# Patient Record
Sex: FEMALE | Race: WHITE | NOT HISPANIC OR LATINO | Employment: OTHER | URBAN - METROPOLITAN AREA
[De-identification: names, ages, dates, MRNs, and addresses within clinical notes are randomized per-mention and may not be internally consistent; named-entity substitution may affect disease eponyms.]

---

## 2017-12-26 ENCOUNTER — TELEPHONE (OUTPATIENT)
Dept: URGENT CARE | Facility: CLINIC | Age: 52
End: 2017-12-26

## 2017-12-26 NOTE — TELEPHONE ENCOUNTER
Flu results entered in ERROR- patient positive for Flu A, negative for Flu B. Unable to change results in computer.

## 2019-05-03 ENCOUNTER — APPOINTMENT (OUTPATIENT)
Dept: LAB | Facility: HOSPITAL | Age: 54
End: 2019-05-03

## 2019-05-03 ENCOUNTER — OFFICE VISIT (OUTPATIENT)
Dept: FAMILY MEDICINE CLINIC | Facility: CLINIC | Age: 54
End: 2019-05-03

## 2019-05-03 VITALS
OXYGEN SATURATION: 97 % | SYSTOLIC BLOOD PRESSURE: 118 MMHG | HEART RATE: 71 BPM | WEIGHT: 217 LBS | DIASTOLIC BLOOD PRESSURE: 80 MMHG | HEIGHT: 65 IN | BODY MASS INDEX: 36.15 KG/M2

## 2019-05-03 DIAGNOSIS — L82.1 SEBORRHEIC KERATOSES: ICD-10-CM

## 2019-05-03 DIAGNOSIS — M25.511 CHRONIC PAIN OF BOTH SHOULDERS: ICD-10-CM

## 2019-05-03 DIAGNOSIS — G89.29 CHRONIC PAIN OF BOTH SHOULDERS: ICD-10-CM

## 2019-05-03 DIAGNOSIS — M25.512 CHRONIC PAIN OF BOTH SHOULDERS: ICD-10-CM

## 2019-05-03 DIAGNOSIS — B18.2 CHRONIC HEPATITIS C WITHOUT HEPATIC COMA (HCC): ICD-10-CM

## 2019-05-03 DIAGNOSIS — R53.82 CHRONIC FATIGUE: ICD-10-CM

## 2019-05-03 DIAGNOSIS — R11.0 CHRONIC NAUSEA: ICD-10-CM

## 2019-05-03 DIAGNOSIS — M25.50 ARTHRALGIA, UNSPECIFIED JOINT: ICD-10-CM

## 2019-05-03 DIAGNOSIS — R05.3 CHRONIC COUGH: Primary | ICD-10-CM

## 2019-05-03 LAB
ALBUMIN SERPL-MCNC: 4.3 G/DL (ref 3.5–5.2)
ALBUMIN/GLOB SERPL: 1.2 G/DL
ALP SERPL-CCNC: 120 U/L (ref 39–117)
ALT SERPL W P-5'-P-CCNC: 25 U/L (ref 1–33)
ANION GAP SERPL CALCULATED.3IONS-SCNC: 12.1 MMOL/L
AST SERPL-CCNC: 27 U/L (ref 1–32)
BASOPHILS # BLD AUTO: 0.05 10*3/MM3 (ref 0–0.2)
BASOPHILS NFR BLD AUTO: 0.5 % (ref 0–1.5)
BILIRUB SERPL-MCNC: 0.3 MG/DL (ref 0.2–1.2)
BUN BLD-MCNC: 16 MG/DL (ref 6–20)
BUN/CREAT SERPL: 18.8 (ref 7–25)
CALCIUM SPEC-SCNC: 9.6 MG/DL (ref 8.6–10.5)
CHLORIDE SERPL-SCNC: 101 MMOL/L (ref 98–107)
CHROMATIN AB SERPL-ACNC: <10 IU/ML (ref 0–14)
CO2 SERPL-SCNC: 22.9 MMOL/L (ref 22–29)
CREAT BLD-MCNC: 0.85 MG/DL (ref 0.57–1)
CRP SERPL-MCNC: 0.75 MG/DL (ref 0–0.5)
DEPRECATED RDW RBC AUTO: 42.4 FL (ref 37–54)
EOSINOPHIL # BLD AUTO: 0.17 10*3/MM3 (ref 0–0.4)
EOSINOPHIL NFR BLD AUTO: 1.6 % (ref 0.3–6.2)
ERYTHROCYTE [DISTWIDTH] IN BLOOD BY AUTOMATED COUNT: 11.7 % (ref 12.3–15.4)
GFR SERPL CREATININE-BSD FRML MDRD: 69 ML/MIN/1.73
GLOBULIN UR ELPH-MCNC: 3.6 GM/DL
GLUCOSE BLD-MCNC: 79 MG/DL (ref 65–99)
HCT VFR BLD AUTO: 42.6 % (ref 34–46.6)
HGB BLD-MCNC: 13.9 G/DL (ref 12–15.9)
IMM GRANULOCYTES # BLD AUTO: 0.04 10*3/MM3 (ref 0–0.05)
IMM GRANULOCYTES NFR BLD AUTO: 0.4 % (ref 0–0.5)
LYMPHOCYTES # BLD AUTO: 3.02 10*3/MM3 (ref 0.7–3.1)
LYMPHOCYTES NFR BLD AUTO: 28 % (ref 19.6–45.3)
MCH RBC QN AUTO: 32.2 PG (ref 26.6–33)
MCHC RBC AUTO-ENTMCNC: 32.6 G/DL (ref 31.5–35.7)
MCV RBC AUTO: 98.6 FL (ref 79–97)
MONOCYTES # BLD AUTO: 0.7 10*3/MM3 (ref 0.1–0.9)
MONOCYTES NFR BLD AUTO: 6.5 % (ref 5–12)
NEUTROPHILS # BLD AUTO: 6.8 10*3/MM3 (ref 1.7–7)
NEUTROPHILS NFR BLD AUTO: 63 % (ref 42.7–76)
NRBC BLD AUTO-RTO: 0 /100 WBC (ref 0–0.2)
PLATELET # BLD AUTO: 256 10*3/MM3 (ref 140–450)
PMV BLD AUTO: 10.4 FL (ref 6–12)
POTASSIUM BLD-SCNC: 4.2 MMOL/L (ref 3.5–5.2)
PROT SERPL-MCNC: 7.9 G/DL (ref 6–8.5)
RBC # BLD AUTO: 4.32 10*6/MM3 (ref 3.77–5.28)
SODIUM BLD-SCNC: 136 MMOL/L (ref 136–145)
TSH SERPL DL<=0.05 MIU/L-ACNC: 2.59 MIU/ML (ref 0.27–4.2)
URATE SERPL-MCNC: 4.5 MG/DL (ref 2.4–5.7)
VIT B12 BLD-MCNC: 484 PG/ML (ref 211–946)
WBC NRBC COR # BLD: 10.78 10*3/MM3 (ref 3.4–10.8)

## 2019-05-03 PROCEDURE — 36415 COLL VENOUS BLD VENIPUNCTURE: CPT | Performed by: FAMILY MEDICINE

## 2019-05-03 PROCEDURE — 84443 ASSAY THYROID STIM HORMONE: CPT | Performed by: FAMILY MEDICINE

## 2019-05-03 PROCEDURE — 84550 ASSAY OF BLOOD/URIC ACID: CPT | Performed by: FAMILY MEDICINE

## 2019-05-03 PROCEDURE — 80053 COMPREHEN METABOLIC PANEL: CPT | Performed by: FAMILY MEDICINE

## 2019-05-03 PROCEDURE — 86038 ANTINUCLEAR ANTIBODIES: CPT | Performed by: FAMILY MEDICINE

## 2019-05-03 PROCEDURE — 82607 VITAMIN B-12: CPT | Performed by: FAMILY MEDICINE

## 2019-05-03 PROCEDURE — 86140 C-REACTIVE PROTEIN: CPT | Performed by: FAMILY MEDICINE

## 2019-05-03 PROCEDURE — 87522 HEPATITIS C REVRS TRNSCRPJ: CPT | Performed by: FAMILY MEDICINE

## 2019-05-03 PROCEDURE — 86431 RHEUMATOID FACTOR QUANT: CPT | Performed by: FAMILY MEDICINE

## 2019-05-03 PROCEDURE — 99214 OFFICE O/P EST MOD 30 MIN: CPT | Performed by: FAMILY MEDICINE

## 2019-05-03 PROCEDURE — 85025 COMPLETE CBC W/AUTO DIFF WBC: CPT | Performed by: FAMILY MEDICINE

## 2019-05-03 RX ORDER — BUPROPION HYDROCHLORIDE 300 MG/1
300 TABLET ORAL DAILY
COMMUNITY

## 2019-05-03 RX ORDER — PANTOPRAZOLE SODIUM 20 MG/1
20 TABLET, DELAYED RELEASE ORAL DAILY
Qty: 30 TABLET | Refills: 2 | Status: SHIPPED | OUTPATIENT
Start: 2019-05-03 | End: 2019-05-08 | Stop reason: CLARIF

## 2019-05-03 NOTE — PROGRESS NOTES
Nathalia Rao presents today to establish care.    Chief Complaint   Patient presents with   • Establish Care     needs new PCP, rashes that come and go, moles to be looked at, back pain, arthritis,    • Cough     chronic   • Shoulder Pain     hurts when wakes up in the morning   • Fatigue   • Nausea        Cough   This is a new problem. The current episode started more than 1 year ago. The problem has been waxing and waning. The cough is non-productive. Associated symptoms include ear pain, myalgias, a rash, shortness of breath and wheezing. Risk factors for lung disease include smoking/tobacco exposure. She has tried nothing for the symptoms. Her past medical history is significant for bronchitis.   Fatigue   This is a new problem. The current episode started more than 1 year ago. The problem has been gradually worsening. Associated symptoms include abdominal pain ( upper), arthralgias, a change in bowel habit, coughing, fatigue, myalgias, nausea, numbness, a rash and weakness.   Nausea   This is a new problem. The current episode started more than 1 year ago. Associated symptoms include abdominal pain ( upper), arthralgias, a change in bowel habit, coughing, fatigue, myalgias, nausea, numbness, a rash and weakness. Treatments tried: dramamine.   Arm Pain    The incident occurred more than 1 week ago (several years). The pain is present in the left shoulder and right shoulder. The quality of the pain is described as aching. The pain has been intermittent since the incident. Associated symptoms include numbness. Nothing aggravates the symptoms. She has tried NSAIDs and acetaminophen for the symptoms. The treatment provided no relief.          Quit vaping 5 months ago and smoked cigarettes off and on. Used to smoke a pack every 3 months.     Taking meds for PTSD and depression has helped but fatigue feels different. Body aches like the flu. Dizziness while walking, but not vertigo.     Nausea first thing waking up.  When she went through menopause had morning sickness. Tends to get constipation, drinks more water and foods high fiber.     Whole body hurts. Inflammation in hands. Feet swell.         PHQ-2/PHQ-9 Depression Screening 5/3/2019   Little interest or pleasure in doing things 0   Feeling down, depressed, or hopeless 1   Total Score 1       Review of Systems   Constitutional: Positive for fatigue.   HENT: Positive for ear pain and trouble swallowing.    Eyes: Positive for pain and itching.   Respiratory: Positive for cough, shortness of breath and wheezing.    Cardiovascular: Positive for leg swelling.   Gastrointestinal: Positive for abdominal pain ( upper), change in bowel habit, constipation and nausea.   Endocrine: Positive for heat intolerance and polydipsia.   Genitourinary: Positive for frequency.   Musculoskeletal: Positive for arthralgias, gait problem and myalgias.   Skin: Positive for color change and rash.   Neurological: Positive for dizziness, weakness and numbness.   Hematological: Bruises/bleeds easily.   Psychiatric/Behavioral: Positive for depressed mood. The patient is nervous/anxious.         Past Medical History:   Diagnosis Date   • Anxiety    • Depression    • Hepatitis C 2001    peg interferon, ribaviron   • Hypertension    • Injury of back    • PTSD (post-traumatic stress disorder)    • Shingles         Past Surgical History:   Procedure Laterality Date   • BACK SURGERY     • CHOLECYSTECTOMY  2001   • COLONOSCOPY  2013    patient states twisted colon   • SPINAL FUSION  2006   • TOTAL ABDOMINAL HYSTERECTOMY WITH SALPINGO OOPHORECTOMY  11/2014    ovarian cysts        Family History   Problem Relation Age of Onset   • Uterine cancer Mother    • Hypertension Mother    • Hyperlipidemia Mother    • Stomach cancer Father    • Colon cancer Father    • Inflammatory bowel disease Father    • Diabetes Maternal Grandmother    • Hypertension Maternal Grandmother    • Hyperlipidemia Maternal Grandmother    •  "Stroke Maternal Grandmother    • Heart attack Maternal Grandfather    • Hypertension Maternal Grandfather         Social History     Socioeconomic History   • Marital status: Single     Spouse name: Not on file   • Number of children: Not on file   • Years of education: Not on file   • Highest education level: Not on file   Tobacco Use   • Smoking status: Former Smoker     Last attempt to quit: 2018     Years since quittin.3   • Smokeless tobacco: Never Used   Substance and Sexual Activity   • Alcohol use: No   • Drug use: Yes     Types: Opium, Other, Oxycodone     Comment: opiotes        Current Outpatient Medications on File Prior to Visit   Medication Sig Dispense Refill   • buPROPion XL (WELLBUTRIN XL) 300 MG 24 hr tablet Take 300 mg by mouth Daily.     • PARoxetine HCl (PAXIL PO) Take 60 mg by mouth Daily.       No current facility-administered medications on file prior to visit.        No Known Allergies     Visit Vitals  /80 (BP Location: Left arm, Patient Position: Sitting, Cuff Size: Adult)   Pulse 71   Ht 165.1 cm (65\")   Wt 98.4 kg (217 lb)   SpO2 97%   BMI 36.11 kg/m²        Physical Exam   Constitutional: She is oriented to person, place, and time. No distress. She is obese.  HENT:   Right Ear: Tympanic membrane and ear canal normal.   Left Ear: Tympanic membrane and ear canal normal.   Nose: Nose normal.   Mouth/Throat: Oropharynx is clear and moist.   Eyes: Conjunctivae are normal. Pupils are equal, round, and reactive to light.   Neck: Neck supple. No thyromegaly present.   Cardiovascular: Normal rate and regular rhythm.   No murmur heard.  Pulses:       Posterior tibial pulses are 2+ on the right side, and 2+ on the left side.   Pulmonary/Chest: Effort normal and breath sounds normal.   Abdominal: Soft. There is no hepatosplenomegaly. There is no tenderness.   Musculoskeletal: She exhibits no edema.   Lymphadenopathy:     She has no cervical adenopathy.   Neurological: She is alert and " oriented to person, place, and time.   Skin: Skin is warm and dry. No rash noted.   Scars bilateral wrists consistent with cutting  SKs left upper back and left thigh   Psychiatric: She has a normal mood and affect.   Vitals reviewed.            Nathalia was seen today for establish care, cough, shoulder pain, fatigue and nausea.    Diagnoses and all orders for this visit:    Chronic cough  -     Full Pulmonary Function Test With Bronchodilator; Future  Evaluate for COPD.  Patient states she mostly had vaping and was very vague regarding her total cigarette use with 1 pack every 3 months.  Seborrheic keratoses  Discussed with patient no signs concerning for skin cancer but likely age-related changes.  Chronic fatigue  -     CBC & Differential; Future  -     Comprehensive Metabolic Panel; Future  -     TSH Rfx On Abnormal To Free T4; Future  -     Vitamin B12; Future  -     CBC & Differential  -     Comprehensive Metabolic Panel  -     TSH Rfx On Abnormal To Free T4  -     Vitamin B12  -     CBC Auto Differential  Check labs today.  Chronic hepatitis C without hepatic coma (CMS/HCC)  -     Comprehensive Metabolic Panel; Future  -     Hepatitis C RNA, Quantitative, PCR (graph); Future  -     Comprehensive Metabolic Panel  -     Hepatitis C RNA, Quantitative, PCR (graph)  Patient reports she previously received treatment 2001.  She is requesting repeat labs today.  Chronic pain of both shoulders  Further evaluation with labs considering ongoing joint pain in multiple locations.  Chronic nausea  -     pantoprazole (PROTONIX) 20 MG EC tablet; Take 1 tablet by mouth Daily.  Start PPI.  Arthralgia, unspecified joint  -     Rheumatoid factor; Future  -     C-reactive protein; Future  -     JOÃO; Future  -     Uric acid; Future  -     Rheumatoid factor  -     C-reactive protein  -     JOÃO  -     Uric acid  Further evaluation with labs considering ongoing joint pain in multiple locations.      Return in about 4 weeks (around  5/31/2019) for Follow-up.

## 2019-05-06 LAB — ANA SER QL: NEGATIVE

## 2019-05-07 LAB
HCV RNA SERPL NAA+PROBE-ACNC: NORMAL IU/ML
TEST INFORMATION: NORMAL

## 2019-05-08 ENCOUNTER — TELEPHONE (OUTPATIENT)
Dept: FAMILY MEDICINE CLINIC | Facility: CLINIC | Age: 54
End: 2019-05-08

## 2019-05-08 NOTE — TELEPHONE ENCOUNTER
Patient's PA for her pantoprazole was denied.  She has to have tried 2 of the following: Nexium, Prevacid, Omeprazole, or Zegrid.

## 2019-05-09 ENCOUNTER — OFFICE VISIT (OUTPATIENT)
Dept: PULMONOLOGY | Facility: CLINIC | Age: 54
End: 2019-05-09

## 2019-05-09 DIAGNOSIS — R05.3 CHRONIC COUGH: ICD-10-CM

## 2019-05-09 PROCEDURE — 94375 RESPIRATORY FLOW VOLUME LOOP: CPT | Performed by: INTERNAL MEDICINE

## 2019-05-09 PROCEDURE — 94726 PLETHYSMOGRAPHY LUNG VOLUMES: CPT | Performed by: INTERNAL MEDICINE

## 2019-05-09 PROCEDURE — 94729 DIFFUSING CAPACITY: CPT | Performed by: INTERNAL MEDICINE

## 2019-05-10 ENCOUNTER — TELEPHONE (OUTPATIENT)
Dept: FAMILY MEDICINE CLINIC | Facility: CLINIC | Age: 54
End: 2019-05-10

## 2019-05-10 RX ORDER — LANSOPRAZOLE 15 MG/1
15 CAPSULE, DELAYED RELEASE ORAL DAILY
Qty: 30 CAPSULE | Refills: 5 | Status: SHIPPED | OUTPATIENT
Start: 2019-05-10 | End: 2020-04-10

## 2019-05-10 NOTE — TELEPHONE ENCOUNTER
Pt called back, informed her of results. She verbalized understanding and had no further questions.

## 2019-05-10 NOTE — TELEPHONE ENCOUNTER
CELINA FROM [facility] CALLED. PT HAS A PRESCRIPTION FOR GENERIC NEXUM BUT [employer] INSURANCE WILL NOT COVER IT. NEEDS A PRESCIPTION FOR PREVACID OVER THE COUNTER.

## 2019-06-14 ENCOUNTER — OFFICE VISIT (OUTPATIENT)
Dept: FAMILY MEDICINE CLINIC | Facility: CLINIC | Age: 54
End: 2019-06-14

## 2019-06-14 VITALS
HEIGHT: 65 IN | WEIGHT: 214 LBS | DIASTOLIC BLOOD PRESSURE: 70 MMHG | BODY MASS INDEX: 35.65 KG/M2 | HEART RATE: 88 BPM | OXYGEN SATURATION: 97 % | SYSTOLIC BLOOD PRESSURE: 128 MMHG

## 2019-06-14 DIAGNOSIS — R53.82 CHRONIC FATIGUE: ICD-10-CM

## 2019-06-14 DIAGNOSIS — M41.9 SCOLIOSIS OF THORACIC SPINE, UNSPECIFIED SCOLIOSIS TYPE: Primary | ICD-10-CM

## 2019-06-14 DIAGNOSIS — M51.36 BULGING LUMBAR DISC: ICD-10-CM

## 2019-06-14 DIAGNOSIS — M51.34 BULGING OF THORACIC INTERVERTEBRAL DISC: ICD-10-CM

## 2019-06-14 PROBLEM — M51.369 BULGING LUMBAR DISC: Status: ACTIVE | Noted: 2019-06-14

## 2019-06-14 PROCEDURE — 99214 OFFICE O/P EST MOD 30 MIN: CPT | Performed by: FAMILY MEDICINE

## 2019-06-14 RX ORDER — MELOXICAM 15 MG/1
7.5-15 TABLET ORAL DAILY PRN
Qty: 30 TABLET | Refills: 5 | Status: SHIPPED | OUTPATIENT
Start: 2019-06-14 | End: 2020-01-14

## 2019-06-14 NOTE — PROGRESS NOTES
Chief Complaint   Patient presents with   • Back Pain   • Fatigue        Back Pain   This is a chronic problem. The current episode started more than 1 year ago. The problem occurs intermittently. The problem has been waxing and waning since onset. The pain is present in the thoracic spine. The quality of the pain is described as aching (throbbing). The pain does not radiate. The pain is at a severity of 8/10. Exacerbated by: nothing. Stiffness is present in the morning. She has tried bed rest and NSAIDs for the symptoms.   Fatigue   This is a chronic problem. Associated symptoms include arthralgias and fatigue. Exacerbated by: sugar, pain. She has tried nothing for the symptoms.      Hurts across where bra is, but not daily. More fatigue when she's in pain. When back pain is worse she has the cough. Ibuprofen 800mg as needed.     Arthritis pain in hands as well.     When she eats too much sugar she doesn't feel good.       Review of Systems   Constitutional: Positive for fatigue.   Musculoskeletal: Positive for arthralgias and back pain.        Current Outpatient Medications on File Prior to Visit   Medication Sig Dispense Refill   • buPROPion XL (WELLBUTRIN XL) 300 MG 24 hr tablet Take 300 mg by mouth Daily.     • lansoprazole (PREVACID) 15 MG capsule Take 1 capsule by mouth Daily. 30 capsule 5   • PARoxetine HCl (PAXIL PO) Take 60 mg by mouth Daily.     • [DISCONTINUED] methylPREDNISolone (MEDROL, DELORES,) 4 MG tablet Take as directed on package instructions. 1 each 0     No current facility-administered medications on file prior to visit.        No Known Allergies    Past Medical History:   Diagnosis Date   • Anxiety    • Bulging lumbar disc    • Bulging of thoracic intervertebral disc    • Depression    • H/O pulmonary function tests 05/09/2019    no obstruction, moderate restriction   • Hepatitis C 2001    peg interferon, ribaviron   • Hypertension    • Injury of back    • PTSD (post-traumatic stress disorder)   "  • Scoliosis    • Shingles         Past Surgical History:   Procedure Laterality Date   • BACK SURGERY     • CHOLECYSTECTOMY     • COLONOSCOPY      patient states twisted colon   • SPINAL FUSION     • TOTAL ABDOMINAL HYSTERECTOMY WITH SALPINGO OOPHORECTOMY  2014    ovarian cysts        Family History   Problem Relation Age of Onset   • Uterine cancer Mother    • Hypertension Mother    • Hyperlipidemia Mother    • Stomach cancer Father    • Colon cancer Father    • Inflammatory bowel disease Father    • Diabetes Maternal Grandmother    • Hypertension Maternal Grandmother    • Hyperlipidemia Maternal Grandmother    • Stroke Maternal Grandmother    • Heart attack Maternal Grandfather    • Hypertension Maternal Grandfather         Social History     Socioeconomic History   • Marital status: Single     Spouse name: Not on file   • Number of children: Not on file   • Years of education: Not on file   • Highest education level: Not on file   Tobacco Use   • Smoking status: Former Smoker     Last attempt to quit: 2018     Years since quittin.4   • Smokeless tobacco: Never Used   Substance and Sexual Activity   • Alcohol use: No   • Drug use: Yes     Types: Opium, Other, Oxycodone     Comment: opiotes        Visit Vitals  /70 (BP Location: Left arm, Patient Position: Sitting, Cuff Size: Adult)   Pulse 88   Ht 165.1 cm (65\")   Wt 97.1 kg (214 lb)   SpO2 97%   BMI 35.61 kg/m²        Physical Exam   Constitutional: No distress. She is obese.  Cardiovascular: Normal rate and regular rhythm.   No murmur heard.  Pulmonary/Chest: Effort normal and breath sounds normal.   Musculoskeletal:        Right shoulder: She exhibits deformity.        Left shoulder: She exhibits deformity.        Thoracic back: She exhibits spasm. She exhibits no bony tenderness.        Lumbar back: She exhibits no bony tenderness and no spasm.        Back:    Bilateral rounded shoulders   Psychiatric: She has a normal mood and " affect.   Vitals reviewed.      Results for orders placed or performed in visit on 05/03/19   Comprehensive Metabolic Panel   Result Value Ref Range    Glucose 79 65 - 99 mg/dL    BUN 16 6 - 20 mg/dL    Creatinine 0.85 0.57 - 1.00 mg/dL    Sodium 136 136 - 145 mmol/L    Potassium 4.2 3.5 - 5.2 mmol/L    Chloride 101 98 - 107 mmol/L    CO2 22.9 22.0 - 29.0 mmol/L    Calcium 9.6 8.6 - 10.5 mg/dL    Total Protein 7.9 6.0 - 8.5 g/dL    Albumin 4.30 3.50 - 5.20 g/dL    ALT (SGPT) 25 1 - 33 U/L    AST (SGOT) 27 1 - 32 U/L    Alkaline Phosphatase 120 (H) 39 - 117 U/L    Total Bilirubin 0.3 0.2 - 1.2 mg/dL    eGFR Non African Amer 69 >60 mL/min/1.73    Globulin 3.6 gm/dL    A/G Ratio 1.2 g/dL    BUN/Creatinine Ratio 18.8 7.0 - 25.0    Anion Gap 12.1 mmol/L   TSH Rfx On Abnormal To Free T4   Result Value Ref Range    TSH 2.590 0.270 - 4.200 mIU/mL   Hepatitis C RNA, Quantitative, PCR (graph)   Result Value Ref Range    Hepatitis C Quantitation HCV Not Detected IU/mL    Test Information Comment    Vitamin B12   Result Value Ref Range    Vitamin B-12 484 211 - 946 pg/mL   Rheumatoid factor   Result Value Ref Range    Rheumatoid Factor Quantitative <10.0 0.0 - 14.0 IU/mL   C-reactive protein   Result Value Ref Range    C-Reactive Protein 0.75 (H) 0.00 - 0.50 mg/dL   JOÃO   Result Value Ref Range    JOÃO Direct Negative Negative   Uric acid   Result Value Ref Range    Uric Acid 4.5 2.4 - 5.7 mg/dL   CBC Auto Differential   Result Value Ref Range    WBC 10.78 3.40 - 10.80 10*3/mm3    RBC 4.32 3.77 - 5.28 10*6/mm3    Hemoglobin 13.9 12.0 - 15.9 g/dL    Hematocrit 42.6 34.0 - 46.6 %    MCV 98.6 (H) 79.0 - 97.0 fL    MCH 32.2 26.6 - 33.0 pg    MCHC 32.6 31.5 - 35.7 g/dL    RDW 11.7 (L) 12.3 - 15.4 %    RDW-SD 42.4 37.0 - 54.0 fl    MPV 10.4 6.0 - 12.0 fL    Platelets 256 140 - 450 10*3/mm3    Neutrophil % 63.0 42.7 - 76.0 %    Lymphocyte % 28.0 19.6 - 45.3 %    Monocyte % 6.5 5.0 - 12.0 %    Eosinophil % 1.6 0.3 - 6.2 %    Basophil %  0.5 0.0 - 1.5 %    Immature Grans % 0.4 0.0 - 0.5 %    Neutrophils, Absolute 6.80 1.70 - 7.00 10*3/mm3    Lymphocytes, Absolute 3.02 0.70 - 3.10 10*3/mm3    Monocytes, Absolute 0.70 0.10 - 0.90 10*3/mm3    Eosinophils, Absolute 0.17 0.00 - 0.40 10*3/mm3    Basophils, Absolute 0.05 0.00 - 0.20 10*3/mm3    Immature Grans, Absolute 0.04 0.00 - 0.05 10*3/mm3    nRBC 0.0 0.0 - 0.2 /100 WBC      MRI OF THE THORACIC SPINE WITHOUT CONTRAST AND MRI OF THE LUMBAR SPINE  WITH AND WITHOUT CONTRAST     HISTORY:  Midback pain, spinal stenosis, and disk herniation.     TECHNIQUE:   MRI of the lumbar spine was obtained with sagittal pre- and  postcontrast T1, proton density, and T2-weighted images. Additionally,  there are axial pre- and postgadolinium T1 and T2-weighted images  through the lumbar spine.     COMPARISON:  Comparison is made to prior MRI of the lumbar spine from  [facility] dated 06/24/2013.     FINDINGS     The conus medullaris terminates at the L1 level and has normal signal  intensity.     At L1-2, this is no significant degree of canal or foraminal narrowing.     At L2-3, there is mild facet arthropathy.     At L3-4, there is mild disk bulging. There is mild facet arthritic  change. There is a mild degree of right foraminal narrowing secondary to  disk bulging. Minimal canal stenosis is identified.     At L4-5, there is disk bulging.     There is anterior translation of L5 with respect to L4 and S1. Overall,  there is approximately 9 mm of anterior translation of L5 with respect  to S1. There has been a prior bilateral laminectomy procedure at the L5  level. There is fusion from L5 down to S1 via bilateral pedicle screws  and posterior bridging rods. Moderate to moderate-to-severe degree of  bilateral foraminal stenosis is identified at the L5-S1 level.  There is  some scar tissue within the epidural space anteriorly at the level of  the posterior body of L5. This is within the vicinity of both L5  nerve  roots as they exit towards the neural foramina.     Overall, when compared to the prior study of 06/24/2013, the findings  are quite similar although the neural foramina were not visualized at  the L5-S1 level on the prior examination.     IMPRESSION-     There anterior translation of L5 with respect to L4 and S1. There is  moderate to moderate-to-severe degree of bilateral foraminal narrowing  at the L5-S1 level secondary to disk bulging. Again, the neural foramina  at the L5-S1 level were not well-visualized on the prior exam due to  significant susceptibility artifact obscuring this area. Otherwise, the  findings are overall, quite similar when compared to the prior study.     Status post bilateral laminectomy and fusion procedure at the L5-S1  level.     There is some scar tissue within the epidural space anteriorly at the  level of the posterior body of L5. This is within the vicinity of both  L5 nerve roots as they exit towards the neural foramina.     TECHNIQUE:   MRI of the thoracic spine was obtained with sagittal T1,  proton density and T2-weighted images. Additionally, there are axial T1  and axial T2-weighted images through the thoracic spine.     FINDINGS:     There is a mild to moderate degree of levoconvex scoliotic curvature of  the thoracic spine with its apex centered at T3-4.     At T5-6, there is a small central protrusion.      At T6-7, there is a small-to-moderate sized right paracentral  protrusion.      At T7-8, there is a relatively small central protrusion.     At T8-9, there is a tiny central protrusion.      At T10-11, there is a minimal disk bulge.     At T11-12, mild disk bulging is identified.     At T12-L1, there is some hypertrophy of the poster elements and there is  mild disk bulging. These findings result in mild to mild-to-moderate  degrees of multilevel canal and foraminal narrowing.     The thoracic spinal cord has normal signal intensity. There are no acute  or subacute  compression deformities. There is a prominent Schmorl's node  seen within the superior endplate of T2.     IMPRESSION-     There is a mild to moderate degree of levoconvex scoliotic curvature of  the thoracic spine with its apex centered at T3-4.     Multiple levels of mild to mild-to-moderate degrees of canal narrowing  are identified secondary to disk bulging and central protrusions as  discussed in detail above.    Nathalia was seen today for back pain and fatigue.    Diagnoses and all orders for this visit:    Scoliosis of thoracic spine, unspecified scoliosis type  -     Ambulatory Referral to Physical Therapy Evaluate and treat  -     meloxicam (MOBIC) 15 MG tablet; Take 0.5-1 tablets by mouth Daily As Needed for Moderate Pain .  Reviewed MRI findings from 2014 in detail with patient.  Recommend NSAIDs and physical therapy.  Switch from ibuprofen to meloxicam.  Advised not to use with over-the-counter NSAIDs but Tylenol is okay.  Bulging of thoracic intervertebral disc  -     Ambulatory Referral to Physical Therapy Evaluate and treat  -     meloxicam (MOBIC) 15 MG tablet; Take 0.5-1 tablets by mouth Daily As Needed for Moderate Pain .  Reviewed MRI findings from 2014 in detail with patient.  Recommend NSAIDs and physical therapy.  Switch from ibuprofen to meloxicam.  Advised not to use with over-the-counter NSAIDs but Tylenol is okay.  Bulging lumbar disc  -     Ambulatory Referral to Physical Therapy Evaluate and treat  -     meloxicam (MOBIC) 15 MG tablet; Take 0.5-1 tablets by mouth Daily As Needed for Moderate Pain .  Reviewed MRI findings from 2014 in detail with patient.  Recommend NSAIDs and physical therapy.  Switch from ibuprofen to meloxicam.  Advised not to use with over-the-counter NSAIDs but Tylenol is okay.  Chronic fatigue  Reviewed with patient lab results from previous visit in detail.  No easily identifiable cause.  Complicating factors include her chronic pain as well as mental illness.        Return in about 5 months (around 11/14/2019) for Follow-up.

## 2019-07-12 ENCOUNTER — APPOINTMENT (OUTPATIENT)
Dept: PHYSICAL THERAPY | Facility: HOSPITAL | Age: 54
End: 2019-07-12

## 2019-08-02 ENCOUNTER — APPOINTMENT (OUTPATIENT)
Dept: PHYSICAL THERAPY | Facility: HOSPITAL | Age: 54
End: 2019-08-02

## 2019-08-21 ENCOUNTER — OFFICE VISIT (OUTPATIENT)
Dept: FAMILY MEDICINE CLINIC | Facility: CLINIC | Age: 54
End: 2019-08-21

## 2019-08-21 VITALS
HEIGHT: 65 IN | WEIGHT: 220 LBS | OXYGEN SATURATION: 98 % | HEART RATE: 81 BPM | TEMPERATURE: 98.3 F | BODY MASS INDEX: 36.65 KG/M2 | SYSTOLIC BLOOD PRESSURE: 124 MMHG | DIASTOLIC BLOOD PRESSURE: 80 MMHG

## 2019-08-21 DIAGNOSIS — R35.0 FREQUENT URINATION: Primary | ICD-10-CM

## 2019-08-21 DIAGNOSIS — N32.81 OVERACTIVE BLADDER: ICD-10-CM

## 2019-08-21 DIAGNOSIS — N81.10 BLADDER PROLAPSE, FEMALE, ACQUIRED: ICD-10-CM

## 2019-08-21 LAB
BILIRUB BLD-MCNC: NEGATIVE MG/DL
CLARITY, POC: CLEAR
COLOR UR: YELLOW
GLUCOSE UR STRIP-MCNC: NEGATIVE MG/DL
KETONES UR QL: NEGATIVE
LEUKOCYTE EST, POC: NEGATIVE
NITRITE UR-MCNC: NEGATIVE MG/ML
PH UR: 6 [PH] (ref 5–8)
PROT UR STRIP-MCNC: NEGATIVE MG/DL
RBC # UR STRIP: NEGATIVE /UL
SP GR UR: 1.03 (ref 1–1.03)
UROBILINOGEN UR QL: NORMAL

## 2019-08-21 PROCEDURE — 99214 OFFICE O/P EST MOD 30 MIN: CPT | Performed by: FAMILY MEDICINE

## 2019-08-21 RX ORDER — OXYBUTYNIN CHLORIDE 10 MG/1
10 TABLET, EXTENDED RELEASE ORAL DAILY
Qty: 30 TABLET | Refills: 2 | Status: SHIPPED | OUTPATIENT
Start: 2019-08-21 | End: 2020-01-14

## 2019-08-21 NOTE — PROGRESS NOTES
Chief Complaint   Patient presents with   • Bladder Problem     trouble emptying bladder, frequent urination, feels bulge in vaginal area worried its swelling in her bladder        Urinary Frequency    This is a new problem. The current episode started more than 1 year ago. The problem occurs intermittently. The problem has been gradually worsening. The quality of the pain is described as burning. There has been no fever. Associated symptoms include frequency and nausea. Pertinent negatives include no hematuria. She has tried nothing for the symptoms.      Worse since April. Works as addiction counselor. Every hour she has to urinate. Worried about bulge in her pelvis when she lifts her belly, gained weight.      Review of Systems   Gastrointestinal: Positive for nausea.   Genitourinary: Positive for frequency and pelvic pain. Negative for hematuria.   Musculoskeletal: Positive for back pain.        Current Outpatient Medications on File Prior to Visit   Medication Sig Dispense Refill   • buPROPion XL (WELLBUTRIN XL) 300 MG 24 hr tablet Take 300 mg by mouth Daily.     • lansoprazole (PREVACID) 15 MG capsule Take 1 capsule by mouth Daily. 30 capsule 5   • meloxicam (MOBIC) 15 MG tablet Take 0.5-1 tablets by mouth Daily As Needed for Moderate Pain . 30 tablet 5   • PARoxetine HCl (PAXIL PO) Take 60 mg by mouth Daily.       No current facility-administered medications on file prior to visit.        No Known Allergies    Past Medical History:   Diagnosis Date   • Anxiety    • Bulging lumbar disc    • Bulging of thoracic intervertebral disc    • Depression    • H/O pulmonary function tests 05/09/2019    no obstruction, moderate restriction   • Hepatitis C 2001    peg interferon, ribaviron   • Hypertension    • Injury of back    • PTSD (post-traumatic stress disorder)    • Scoliosis    • Shingles         Past Surgical History:   Procedure Laterality Date   • BACK SURGERY     • CHOLECYSTECTOMY  2001   • COLONOSCOPY  2013  "   patient states twisted colon   • SPINAL FUSION     • TOTAL ABDOMINAL HYSTERECTOMY WITH SALPINGO OOPHORECTOMY  2014    ovarian cysts        Family History   Problem Relation Age of Onset   • Uterine cancer Mother    • Hypertension Mother    • Hyperlipidemia Mother    • Stomach cancer Father    • Colon cancer Father    • Inflammatory bowel disease Father    • Diabetes Maternal Grandmother    • Hypertension Maternal Grandmother    • Hyperlipidemia Maternal Grandmother    • Stroke Maternal Grandmother    • Heart attack Maternal Grandfather    • Hypertension Maternal Grandfather         Social History     Socioeconomic History   • Marital status: Single     Spouse name: Not on file   • Number of children: Not on file   • Years of education: Not on file   • Highest education level: Not on file   Tobacco Use   • Smoking status: Former Smoker     Last attempt to quit: 2018     Years since quittin.6   • Smokeless tobacco: Never Used   Substance and Sexual Activity   • Alcohol use: No   • Drug use: Yes     Types: Opium, Other, Oxycodone     Comment: opiotes        Visit Vitals  /80 (BP Location: Left arm, Patient Position: Sitting, Cuff Size: Large Adult)   Pulse 81   Temp 98.3 °F (36.8 °C) (Oral)   Ht 165.1 cm (65\")   Wt 99.8 kg (220 lb)   SpO2 98%   BMI 36.61 kg/m²        Physical Exam   Constitutional: No distress. She is obese.  Cardiovascular: Normal rate and regular rhythm.   No murmur heard.  Pulmonary/Chest: Effort normal and breath sounds normal.   Abdominal: Soft.   Large pannus   Genitourinary: Uterus is absent.   Cervix is absent. Right adnexum is palpable.Left adnexum is palpable.No erythema or bleeding in the vagina. No vaginal discharge found. There is a cystocele present in the vagina.  Genitourinary Comments: No suprapubic tenderness.   Normal external female genitalia.   Chaperone Ivon Mcgowan MA     Psychiatric: She has a normal mood and affect.   Vitals reviewed.    Results for orders " placed or performed in visit on 08/21/19   POCT urinalysis dipstick, automated   Result Value Ref Range    Color Yellow Yellow, Straw, Dark Yellow, Ashleigh    Clarity, UA Clear Clear    Specific Gravity  1.030 1.005 - 1.030    pH, Urine 6.0 5.0 - 8.0    Leukocytes Negative Negative    Nitrite, UA Negative Negative    Protein, POC Negative Negative mg/dL    Glucose, UA Negative Negative, 1000 mg/dL (3+) mg/dL    Ketones, UA Negative Negative    Urobilinogen, UA Normal Normal    Bilirubin Negative Negative    Blood, UA Negative Negative            Nathalia was seen today for bladder problem.    Diagnoses and all orders for this visit:    Frequent urination  -     POCT urinalysis dipstick, automated  No signs of acute UTI or need for antibiotics.   Overactive bladder  -     Ambulatory Referral to Urology  -     oxybutynin XL (DITROPAN XL) 10 MG 24 hr tablet; Take 1 tablet by mouth Daily.  New. Discussed risks, benefits, and potential side effects with patient. Start oxybutynin.   Bladder prolapse, female, acquired  -     Ambulatory Referral to Urology  Patient interested in surgical options as well.       Return if symptoms worsen or fail to improve.

## 2020-01-14 DIAGNOSIS — M51.36 BULGING LUMBAR DISC: ICD-10-CM

## 2020-01-14 DIAGNOSIS — N32.81 OVERACTIVE BLADDER: ICD-10-CM

## 2020-01-14 DIAGNOSIS — M41.9 SCOLIOSIS OF THORACIC SPINE, UNSPECIFIED SCOLIOSIS TYPE: ICD-10-CM

## 2020-01-14 DIAGNOSIS — M51.34 BULGING OF THORACIC INTERVERTEBRAL DISC: ICD-10-CM

## 2020-01-14 RX ORDER — MELOXICAM 15 MG/1
7.5-15 TABLET ORAL DAILY PRN
Qty: 30 TABLET | Refills: 5 | OUTPATIENT
Start: 2020-01-14 | End: 2022-06-27

## 2020-01-14 RX ORDER — OXYBUTYNIN CHLORIDE 10 MG/1
TABLET, EXTENDED RELEASE ORAL
Qty: 30 TABLET | Refills: 2 | Status: SHIPPED | OUTPATIENT
Start: 2020-01-14 | End: 2020-04-10

## 2020-04-10 ENCOUNTER — TELEMEDICINE (OUTPATIENT)
Dept: FAMILY MEDICINE CLINIC | Facility: CLINIC | Age: 55
End: 2020-04-10

## 2020-04-10 DIAGNOSIS — R10.11 RUQ ABDOMINAL PAIN: Primary | ICD-10-CM

## 2020-04-10 PROCEDURE — 99214 OFFICE O/P EST MOD 30 MIN: CPT | Performed by: FAMILY MEDICINE

## 2020-04-10 RX ORDER — PROMETHAZINE HYDROCHLORIDE 25 MG/1
12.5-25 TABLET ORAL EVERY 8 HOURS PRN
Qty: 30 TABLET | Refills: 1 | OUTPATIENT
Start: 2020-04-10 | End: 2022-06-27

## 2020-04-10 RX ORDER — OMEPRAZOLE 20 MG/1
20 TABLET, DELAYED RELEASE ORAL DAILY
Qty: 90 TABLET | Refills: 1 | OUTPATIENT
Start: 2020-04-10 | End: 2022-06-27

## 2020-04-10 NOTE — PROGRESS NOTES
Telehealth video visit.     Chief Complaint   Patient presents with   • Abdominal Pain        Abdominal Pain   This is a new problem. The current episode started more than 1 month ago. The problem occurs intermittently. The most recent episode lasted 3 days. The problem has been unchanged. The pain is located in the RUQ. The pain is mild. The quality of the pain is dull. The abdominal pain radiates to the epigastric region (side). Associated symptoms include diarrhea and nausea. Pertinent negatives include no fever or vomiting. Nothing aggravates the pain. The pain is relieved by nothing. Treatments tried: pepto-bismol. Prior workup: outside labs. Her past medical history is significant for abdominal surgery.        Pain under right breast and armpit, radiates to right side. Onset intermittently since the Fall. She has diarrhea sometimes. Sometimes has cough. Denies tenderness to touch. Dull RUQ pain. Friend said it feels like its swollen. 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 No change with eating. Episodic pain lasts for days at a time. Insurance won't cover prevacid. She has tried pepto-bismol and benadryl. Sometimes she feels hot and cold but no fever.           Review of Systems   Constitutional: Negative for chills and fever.   Gastrointestinal: Positive for abdominal pain, diarrhea and nausea. Negative for vomiting.        Current Outpatient Medications on File Prior to Visit   Medication Sig Dispense Refill   • buPROPion XL (WELLBUTRIN XL) 300 MG 24 hr tablet Take 300 mg by mouth Daily.     • fluticasone (FLONASE) 50 MCG/ACT nasal spray 2 sprays into the nostril(s) as directed by provider Daily. 1 bottle 0   • hydrOXYzine Pamoate (VISTARIL PO) Take  by mouth.     • meloxicam (MOBIC) 15 MG tablet TAKE 0.5-1 TABLETS BY MOUTH DAILY AS NEEDED FOR MODERATE PAIN . 30 tablet 5   • PARoxetine HCl (PAXIL PO) Take 60 mg by mouth Daily.     • [DISCONTINUED]  lansoprazole (PREVACID) 15 MG capsule Take 1 capsule by mouth Daily. 30 capsule 5   • [DISCONTINUED] oxybutynin XL (DITROPAN-XL) 10 MG 24 hr tablet TAKE 1 TABLET BY MOUTH EVERY DAY 30 tablet 2     No current facility-administered medications on file prior to visit.        No Known Allergies    Past Medical History:   Diagnosis Date   • Anxiety    • Bulging lumbar disc    • Bulging of thoracic intervertebral disc    • Depression    • H/O pulmonary function tests 2019    no obstruction, moderate restriction   • Hepatitis C     peg interferon, ribaviron   • Hypertension    • Injury of back    • PTSD (post-traumatic stress disorder)    • Scoliosis    • Shingles         Past Surgical History:   Procedure Laterality Date   • BACK SURGERY     • CHOLECYSTECTOMY     • COLONOSCOPY      patient states twisted colon   • SPINAL FUSION     • TOTAL ABDOMINAL HYSTERECTOMY WITH SALPINGO OOPHORECTOMY  2014    ovarian cysts        Family History   Problem Relation Age of Onset   • Uterine cancer Mother    • Hypertension Mother    • Hyperlipidemia Mother    • Stomach cancer Father    • Colon cancer Father    • Inflammatory bowel disease Father    • Diabetes Maternal Grandmother    • Hypertension Maternal Grandmother    • Hyperlipidemia Maternal Grandmother    • Stroke Maternal Grandmother    • Heart attack Maternal Grandfather    • Hypertension Maternal Grandfather         Social History     Socioeconomic History   • Marital status: Single     Spouse name: Not on file   • Number of children: Not on file   • Years of education: Not on file   • Highest education level: Not on file   Occupational History   • Occupation: addiction counselor   Tobacco Use   • Smoking status: Former Smoker     Last attempt to quit: 2018     Years since quittin.2   • Smokeless tobacco: Never Used   Substance and Sexual Activity   • Alcohol use: No   • Drug use: Yes     Types: Opium, Other, Oxycodone     Comment: opiotes         Physical Exam   Constitutional: She is oriented to person, place, and time. She is cooperative. She does not appear ill.   HENT:   Right Ear: Hearing normal.   Left Ear: Hearing normal.   Eyes: Conjunctivae and EOM are normal.   Pulmonary/Chest: Effort normal. No respiratory distress.   Abdominal: There is no tenderness ( to RUQ by patient palpation).   Neurological: She is alert and oriented to person, place, and time.   Psychiatric: She has a normal mood and affect. Her behavior is normal. Judgment and thought content normal.       Nathalia was seen today for abdominal pain.    Diagnoses and all orders for this visit:    RUQ abdominal pain  -     omeprazole OTC (PrilOSEC OTC) 20 MG EC tablet; Take 1 tablet by mouth Daily.  -     promethazine (PHENERGAN) 25 MG tablet; Take 0.5-1 tablets by mouth Every 8 (Eight) Hours As Needed for Nausea.    New.  Patient reportedly had labs done at outside facility with normal neuro liver function tests.  Concern with the location of her pain as well as history of hepatitis C for further evaluation with liver ultrasound will have to be deferred until after the pandemic.  No signs of acute infection at this time or need for antibiotics.  Of note she had stopped Prevacid so differential also includes gastritis.  Plan to send new prescription for omeprazole if insurance will cover it.  Additionally will provide a prescription of Phenergan to help with nausea while awaiting diagnostic work-up.     No follow-ups on file.    Start of visit 11:03 . End of visit 11:14.     Dr. Autumn Coronado

## 2020-04-13 DIAGNOSIS — N32.81 OVERACTIVE BLADDER: ICD-10-CM

## 2020-04-13 RX ORDER — OXYBUTYNIN CHLORIDE 10 MG/1
TABLET, EXTENDED RELEASE ORAL
Qty: 90 TABLET | Refills: 0 | OUTPATIENT
Start: 2020-04-13

## 2020-04-29 DIAGNOSIS — R10.11 RUQ ABDOMINAL PAIN: Primary | ICD-10-CM

## 2020-10-26 PROCEDURE — U0003 INFECTIOUS AGENT DETECTION BY NUCLEIC ACID (DNA OR RNA); SEVERE ACUTE RESPIRATORY SYNDROME CORONAVIRUS 2 (SARS-COV-2) (CORONAVIRUS DISEASE [COVID-19]), AMPLIFIED PROBE TECHNIQUE, MAKING USE OF HIGH THROUGHPUT TECHNOLOGIES AS DESCRIBED BY CMS-2020-01-R: HCPCS | Performed by: FAMILY MEDICINE

## 2020-10-30 ENCOUNTER — TELEPHONE (OUTPATIENT)
Dept: URGENT CARE | Facility: CLINIC | Age: 55
End: 2020-10-30

## 2020-11-02 ENCOUNTER — TELEPHONE (OUTPATIENT)
Dept: URGENT CARE | Facility: CLINIC | Age: 55
End: 2020-11-02

## 2020-11-02 NOTE — TELEPHONE ENCOUNTER
----- Message from Mateo Tamayo MD sent at 10/28/2020  1:23 PM EDT -----  Please inform patient of negative lab work    ----- Message -----  From: Lab, Background User  Sent: 10/28/2020   1:10 PM EDT  To:  [facility] Covid Results

## 2021-01-29 PROBLEM — Z01.419 WELL WOMAN EXAM: Status: ACTIVE | Noted: 2021-01-29

## 2022-06-27 PROCEDURE — U0004 COV-19 TEST NON-CDC HGH THRU: HCPCS | Performed by: FAMILY MEDICINE

## 2023-03-01 ENCOUNTER — TELEPHONE (OUTPATIENT)
Dept: FAMILY MEDICINE CLINIC | Facility: CLINIC | Age: 58
End: 2023-03-01

## 2023-03-01 NOTE — TELEPHONE ENCOUNTER
Attempted to contact patient via phone to discuss no-show policy.  No answer, letter mailed to home address on chart.  
complains of pain/discomfort

## 2023-03-08 ENCOUNTER — TELEPHONE (OUTPATIENT)
Dept: FAMILY MEDICINE CLINIC | Facility: CLINIC | Age: 58
End: 2023-03-08

## 2023-09-27 ENCOUNTER — LAB (OUTPATIENT)
Dept: INTERNAL MEDICINE | Facility: CLINIC | Age: 58
End: 2023-09-27
Payer: MEDICAID

## 2023-09-27 ENCOUNTER — OFFICE VISIT (OUTPATIENT)
Dept: INTERNAL MEDICINE | Facility: CLINIC | Age: 58
End: 2023-09-27
Payer: MEDICAID

## 2023-09-27 VITALS
WEIGHT: 185.4 LBS | TEMPERATURE: 97.6 F | RESPIRATION RATE: 20 BRPM | HEART RATE: 76 BPM | BODY MASS INDEX: 30.89 KG/M2 | DIASTOLIC BLOOD PRESSURE: 72 MMHG | HEIGHT: 65 IN | OXYGEN SATURATION: 97 % | SYSTOLIC BLOOD PRESSURE: 120 MMHG

## 2023-09-27 DIAGNOSIS — G62.9 POLYNEUROPATHY: ICD-10-CM

## 2023-09-27 DIAGNOSIS — R05.3 CHRONIC COUGH: ICD-10-CM

## 2023-09-27 DIAGNOSIS — R53.83 FATIGUE, UNSPECIFIED TYPE: ICD-10-CM

## 2023-09-27 DIAGNOSIS — F41.9 ANXIETY: Primary | ICD-10-CM

## 2023-09-27 DIAGNOSIS — M25.50 ARTHRALGIA, UNSPECIFIED JOINT: ICD-10-CM

## 2023-09-27 DIAGNOSIS — Z87.42 HX OF OVARIAN CYST: ICD-10-CM

## 2023-09-27 DIAGNOSIS — F33.1 MODERATE EPISODE OF RECURRENT MAJOR DEPRESSIVE DISORDER: ICD-10-CM

## 2023-09-27 DIAGNOSIS — R21 MALAR RASH: ICD-10-CM

## 2023-09-27 DIAGNOSIS — Z90.710 HX OF HYSTERECTOMY, TOTAL: ICD-10-CM

## 2023-09-27 LAB
25(OH)D3 SERPL-MCNC: 14.8 NG/ML (ref 30–100)
ALBUMIN SERPL-MCNC: 4.1 G/DL (ref 3.5–5.2)
ALBUMIN/GLOB SERPL: 1.2 G/DL
ALP SERPL-CCNC: 82 U/L (ref 39–117)
ALT SERPL W P-5'-P-CCNC: 35 U/L (ref 1–33)
ANION GAP SERPL CALCULATED.3IONS-SCNC: 8.9 MMOL/L (ref 5–15)
AST SERPL-CCNC: 56 U/L (ref 1–32)
BILIRUB SERPL-MCNC: 0.7 MG/DL (ref 0–1.2)
BUN SERPL-MCNC: 5 MG/DL (ref 8–23)
BUN/CREAT SERPL: 6.4 (ref 7–25)
CALCIUM SPEC-SCNC: 9.4 MG/DL (ref 8.6–10.5)
CHLORIDE SERPL-SCNC: 103 MMOL/L (ref 98–107)
CHOLEST SERPL-MCNC: 173 MG/DL (ref 0–200)
CHROMATIN AB SERPL-ACNC: <10 IU/ML (ref 0–14)
CO2 SERPL-SCNC: 25.1 MMOL/L (ref 22–29)
CREAT SERPL-MCNC: 0.78 MG/DL (ref 0.57–1)
CRP SERPL-MCNC: <0.3 MG/DL (ref 0–0.5)
DEPRECATED RDW RBC AUTO: 48 FL (ref 37–54)
EGFRCR SERPLBLD CKD-EPI 2021: 87.1 ML/MIN/1.73
ERYTHROCYTE [DISTWIDTH] IN BLOOD BY AUTOMATED COUNT: 13.1 % (ref 12.3–15.4)
ERYTHROCYTE [SEDIMENTATION RATE] IN BLOOD: 23 MM/HR (ref 0–30)
FOLATE SERPL-MCNC: 6.65 NG/ML (ref 4.78–24.2)
GLOBULIN UR ELPH-MCNC: 3.4 GM/DL
GLUCOSE SERPL-MCNC: 107 MG/DL (ref 65–99)
HCT VFR BLD AUTO: 39.9 % (ref 34–46.6)
HDLC SERPL-MCNC: 43 MG/DL (ref 40–60)
HGB BLD-MCNC: 13.7 G/DL (ref 12–15.9)
LDLC SERPL CALC-MCNC: 118 MG/DL (ref 0–100)
LDLC/HDLC SERPL: 2.73 {RATIO}
MCH RBC QN AUTO: 34 PG (ref 26.6–33)
MCHC RBC AUTO-ENTMCNC: 34.3 G/DL (ref 31.5–35.7)
MCV RBC AUTO: 99 FL (ref 79–97)
PLATELET # BLD AUTO: 175 10*3/MM3 (ref 140–450)
PMV BLD AUTO: 9.8 FL (ref 6–12)
POTASSIUM SERPL-SCNC: 3.5 MMOL/L (ref 3.5–5.2)
PROT SERPL-MCNC: 7.5 G/DL (ref 6–8.5)
RBC # BLD AUTO: 4.03 10*6/MM3 (ref 3.77–5.28)
SODIUM SERPL-SCNC: 137 MMOL/L (ref 136–145)
TRIGL SERPL-MCNC: 63 MG/DL (ref 0–150)
TSH SERPL DL<=0.05 MIU/L-ACNC: 1.45 UIU/ML (ref 0.27–4.2)
VIT B12 BLD-MCNC: 563 PG/ML (ref 211–946)
VLDLC SERPL-MCNC: 12 MG/DL (ref 5–40)
WBC NRBC COR # BLD: 7.66 10*3/MM3 (ref 3.4–10.8)

## 2023-09-27 PROCEDURE — 82607 VITAMIN B-12: CPT

## 2023-09-27 PROCEDURE — 86140 C-REACTIVE PROTEIN: CPT

## 2023-09-27 PROCEDURE — 80061 LIPID PANEL: CPT

## 2023-09-27 PROCEDURE — 36415 COLL VENOUS BLD VENIPUNCTURE: CPT

## 2023-09-27 PROCEDURE — 80050 GENERAL HEALTH PANEL: CPT

## 2023-09-27 PROCEDURE — 82306 VITAMIN D 25 HYDROXY: CPT

## 2023-09-27 PROCEDURE — 85652 RBC SED RATE AUTOMATED: CPT

## 2023-09-27 PROCEDURE — 86038 ANTINUCLEAR ANTIBODIES: CPT

## 2023-09-27 PROCEDURE — 86431 RHEUMATOID FACTOR QUANT: CPT

## 2023-09-27 PROCEDURE — 82746 ASSAY OF FOLIC ACID SERUM: CPT

## 2023-09-27 RX ORDER — BUPROPION HYDROCHLORIDE 300 MG/1
300 TABLET ORAL DAILY
Qty: 30 TABLET | Refills: 5 | Status: SHIPPED | OUTPATIENT
Start: 2023-09-27

## 2023-09-27 RX ORDER — BUSPIRONE HYDROCHLORIDE 5 MG/1
5 TABLET ORAL 3 TIMES DAILY
Qty: 90 TABLET | Refills: 3 | Status: SHIPPED | OUTPATIENT
Start: 2023-09-27

## 2023-09-27 NOTE — PROGRESS NOTES
New Patient Office Visit      Date: 2023  Patient Name: Nathalia Rao  : 1965   MRN: [id number]     Chief Complaint:    Chief Complaint   Patient presents with    Cough     Has had a cough for a while.     Anxiety    Depression       History of Present Illness: Nathalia Rao is a 58 y.o. female who is here today to establish care.     Depression/Anxiety-patient has history of depression and anxiety.  Used to see therapist at the [facility] but has stopped going to them.  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 Used to take wellbutrin 300 mg, Paxil and adderral but as been out of medications for 1 month.  Feels like anxiety and depression has been worse without medications.    PHQ-9 Depression Screening  Little interest or pleasure in doing things? 2-->more than half the days   Feeling down, depressed, or hopeless? 0-->not at all   Trouble falling or staying asleep, or sleeping too much? 0-->not at all   Feeling tired or having little energy? 3-->nearly every day   Poor appetite or overeating? 3-->nearly every day   Feeling bad about yourself - or that you are a failure or have let yourself or your family down? 0-->not at all   Trouble concentrating on things, such as reading the newspaper or watching television? 3-->nearly every day   Moving or speaking so slowly that other people could have noticed? Or the opposite - being so fidgety or restless that you have been moving around a lot more than usual? 3-->nearly every day   Thoughts that you would be better off dead, or of hurting yourself in some way? 0-->not at all   PHQ-9 Total Score 14   If you checked off any problems, how difficult have these problems made it for you to do your work, take care of things at home, or get along with other people? somewhat difficult      Anxiety KVNG-7    Feeling nervous, anxious or on edge: Nearly every  "day  Not being able to stop or control worrying: Not at all  Worrying too much about different things: Nearly every day  Trouble Relaxing: More than half the days  Being so restless that it is hard to sit still: Not at all  Becoming easily annoyed or irritable: Not at all  Feeling afraid as if something awful might happen: Not at all  KVNG 7 Total Score: 8  If you checked any problems, how difficult have these problems made it for you to do your work, take care of things at home, or get along with other people: Somewhat difficult     Joint pain- reports having chronic joint pain.  Had full rheumatoid work-up in 2019 which was negative.  Still concerned she may have autoimmune disease. Reports having a rash under eyes and rash to chest. States she feels she has \"flare ups\" for the past 5 years.  Reports feeling achy, her joints hurt, decrease appetite and fatigued during these flares.     Neuropathy- reports chronic neuropathy to feet. States it feels like her feet are \"asleep\" all the time.    Reports intermittent tobacco use over the years. Reports quitting multiple times but states she began smoking again 2 years ago.  Smokes 1 pack/day.  Started when she was 10 years old. Does have chronic product  cough from the smoking.   Subjective      Review of Systems:   Review of Systems   Constitutional:  Positive for appetite change and fatigue. Negative for fever.   HENT:  Positive for sinus pressure.    Eyes: Negative.    Respiratory:  Positive for cough and shortness of breath. Negative for wheezing and stridor.    Cardiovascular:  Positive for leg swelling. Negative for chest pain and palpitations.   Gastrointestinal:  Positive for diarrhea. Negative for abdominal pain, blood in stool, constipation, nausea and vomiting.   Endocrine: Negative.    Genitourinary: Negative.    Musculoskeletal:  Positive for arthralgias, back pain and myalgias.   Skin:  Positive for rash.   Neurological:  Positive for dizziness and " light-headedness. Negative for syncope, weakness and headaches.   Hematological: Negative.    Psychiatric/Behavioral:  The patient is nervous/anxious.       Past Medical History:   Past Medical History:   Diagnosis Date    ADHD (attention deficit hyperactivity disorder) 20 years ago    Anxiety     Arthritis About 10 years ago    Getting worse    Bulging lumbar disc     Bulging of thoracic intervertebral disc     Depression     Fibromyalgia     Fibromyalgia, primary About 6 years ago    H/O pulmonary function tests 05/09/2019    no obstruction, moderate restriction    Hepatitis C 2001    peg interferon, ribaviron    Hyperlipidemia 6 years ago    Hypertension     Injury of back     Irritable bowel syndrome 30 years ago    Low back pain 2003    Progressively worse    Murmur, cardiac     Neuropathy     PTSD (post-traumatic stress disorder)     Raynaud's disease     Scoliosis     Shingles        Past Surgical History:   Past Surgical History:   Procedure Laterality Date    BACK SURGERY      CHOLECYSTECTOMY  2001    COLONOSCOPY  2013    patient states twisted colon    SPINAL FUSION  2006    TOTAL ABDOMINAL HYSTERECTOMY WITH SALPINGO OOPHORECTOMY  11/2014    ovarian cysts       Family History:   Family History   Problem Relation Age of Onset    Uterine cancer Mother     Hypertension Mother     Hyperlipidemia Mother     Anxiety disorder Mother     Cancer Mother     Depression Mother     Heart disease Mother     Mental illness Mother     Stomach cancer Father     Colon cancer Father     Inflammatory bowel disease Father     Alcohol abuse Father     Cancer Father     Mental illness Father     Diabetes Maternal Grandmother     Hypertension Maternal Grandmother     Hyperlipidemia Maternal Grandmother     Stroke Maternal Grandmother     Drug abuse Maternal Grandmother     Mental illness Maternal Grandmother     Heart attack Maternal Grandfather     Hypertension Maternal Grandfather        Social History:   Social History  "    Socioeconomic History    Marital status:    Tobacco Use    Smoking status: Every Day     Packs/day: 0.50     Years: 5.00     Pack years: 2.50     Types: Cigarettes     Last attempt to quit: 2018     Years since quittin.7    Smokeless tobacco: Never   Vaping Use    Vaping Use: Some days   Substance and Sexual Activity    Alcohol use: No    Drug use: Not Currently     Types: Opium, Oxycodone, Other     Comment: opiotes - sobriety date 3/8/1989    Sexual activity: Yes     Partners: Female     Birth control/protection: None       Medications:     Current Outpatient Medications:     albuterol sulfate  (90 Base) MCG/ACT inhaler, Inhale 2 puffs Every 4 (Four) Hours As Needed for Wheezing or Shortness of Air., Disp: 18 g, Rfl: 0    amoxicillin-clavulanate (AUGMENTIN) 875-125 MG per tablet, Take 1 tablet by mouth 2 (Two) Times a Day., Disp: 10 tablet, Rfl: 0    buPROPion XL (Wellbutrin XL) 300 MG 24 hr tablet, Take 1 tablet by mouth Daily., Disp: 30 tablet, Rfl: 5    busPIRone (BUSPAR) 5 MG tablet, Take 1 tablet by mouth 3 (Three) Times a Day., Disp: 90 tablet, Rfl: 3    fluticasone (FLONASE) 50 MCG/ACT nasal spray, 2 sprays into the nostril(s) as directed by provider Daily., Disp: 1 bottle, Rfl: 0    predniSONE (DELTASONE) 20 MG tablet, Take 2 tablets by mouth Daily., Disp: 10 tablet, Rfl: 0    Allergies:   No Known Allergies    Objective     Physical Exam:  Vital Signs:   Vitals:    23 0903   BP: 120/72   Pulse: 76   Resp: 20   Temp: 97.6 °F (36.4 °C)   TempSrc: Temporal   SpO2: 97%   Weight: 84.1 kg (185 lb 6.4 oz)   Height: 165.1 cm (65\")   PainSc: 0-No pain     Body mass index is 30.85 kg/m².   BMI is >= 30 and <35. (Class 1 Obesity). The following options were offered after discussion;: nutrition counseling/recommendations       Physical Exam  Constitutional:       Appearance: Normal appearance.   HENT:      Head: Normocephalic and atraumatic.      Right Ear: Tympanic membrane, ear canal " and external ear normal. There is no impacted cerumen.      Left Ear: Tympanic membrane, ear canal and external ear normal. There is no impacted cerumen.      Nose: Nose normal. No congestion or rhinorrhea.      Mouth/Throat:      Mouth: Mucous membranes are moist.      Dentition: Abnormal dentition. Does not have dentures. No gingival swelling or dental abscesses.      Pharynx: Oropharynx is clear. No oropharyngeal exudate or posterior oropharyngeal erythema.      Tonsils: No tonsillar exudate or tonsillar abscesses. 0 on the right. 0 on the left.   Neck:      Thyroid: No thyroid mass, thyromegaly or thyroid tenderness.   Cardiovascular:      Rate and Rhythm: Normal rate and regular rhythm.      Pulses: Normal pulses.           Radial pulses are 2+ on the right side and 2+ on the left side.        Dorsalis pedis pulses are 2+ on the right side and 2+ on the left side.      Heart sounds: Murmur heard.   Pulmonary:      Effort: Pulmonary effort is normal.      Breath sounds: Examination of the right-upper field reveals rhonchi. Examination of the right-middle field reveals rhonchi. Rhonchi present. No wheezing or rales.   Abdominal:      General: Abdomen is flat. Bowel sounds are normal.      Palpations: Abdomen is soft.      Tenderness: There is no abdominal tenderness.   Musculoskeletal:      Right lower leg: No edema.      Left lower leg: No edema.   Lymphadenopathy:      Cervical: No cervical adenopathy.   Skin:     Findings: Rash is macular.             Comments: Malar rash   Neurological:      General: No focal deficit present.      Mental Status: She is alert and oriented to person, place, and time. Mental status is at baseline.   Psychiatric:         Attention and Perception: Attention normal.         Mood and Affect: Mood normal.         Speech: Speech normal.         Behavior: Behavior normal.         Thought Content: Thought content normal.         Cognition and Memory: Cognition normal.         Judgment:  Judgment normal.       Smoking Cessation:   3-10 mintues spent counseling Will try to cut down    Assessment / Plan      Assessment/Plan:   Diagnoses and all orders for this visit:    1. Anxiety (Primary)  -     busPIRone (BUSPAR) 5 MG tablet; Take 1 tablet by mouth 3 (Three) Times a Day.  Dispense: 90 tablet; Refill: 3  -     Ambulatory Referral to Psychiatry    2. Moderate episode of recurrent major depressive disorder  -     buPROPion XL (Wellbutrin XL) 300 MG 24 hr tablet; Take 1 tablet by mouth Daily.  Dispense: 30 tablet; Refill: 5  -     Ambulatory Referral to Psychiatry    3. Arthralgia, unspecified joint  -     Vitamin B12; Future  -     Folate; Future  -     C-reactive protein; Future  -     Sedimentation rate, automated; Future  -     JOÃO; Future  -     Rheumatoid Factor; Future    4. Fatigue, unspecified type  -     Comprehensive Metabolic Panel; Future  -     CBC No Differential; Future  -     Lipid panel; Future  -     TSH Rfx On Abnormal To Free T4; Future  -     Vitamin D 25 hydroxy; Future  -     C-reactive protein; Future  -     Sedimentation rate, automated; Future  -     JOÃO; Future  -     Rheumatoid Factor; Future  -      CT Chest Low Dose Cancer Screening WO; Future    5. Polyneuropathy  -     Vitamin B12; Future  -     Folate; Future    6. Hx of hysterectomy, total  -     Ambulatory Referral to Gynecology    7. Hx of ovarian cyst  -     Ambulatory Referral to Gynecology    8. Chronic cough  -      CT Chest Low Dose Cancer Screening WO; Future    9. Malar rash  -     JOÃO; Future  -     Rheumatoid Factor; Future           Follow Up:   Return in about 1 month (around 10/27/2023) for Annual. & f/u on anxiety/depression  If a referral was made please contact our office if you have not heard about an appointment in the next 2 weeks.   If labs or images are ordered we will contact you with the results within the next week.  If you have not heard from us after a week please call our office to inquire  about the results.    At [facility], we believe that sharing information builds trust and better relationships. You are receiving this note because you recently visited [facility]. It is possible you will see health information before a provider has talked with you about it. This kind of information can be easy to misunderstand. To help you fully understand what it means for your health, we urge you to discuss this note with your provider.    JUAN DIEGO Mukherjee   [facility]

## 2023-09-28 LAB — ANA SER QL: NEGATIVE

## 2023-09-29 ENCOUNTER — PATIENT ROUNDING (BHMG ONLY) (OUTPATIENT)
Dept: INTERNAL MEDICINE | Facility: CLINIC | Age: 58
End: 2023-09-29
Payer: MEDICAID

## 2023-09-29 NOTE — PROGRESS NOTES
A [employer] message has been sent to the patient for patient rounding with [facility]

## 2023-10-02 DIAGNOSIS — E55.9 VITAMIN D DEFICIENCY: Primary | ICD-10-CM

## 2023-10-02 RX ORDER — ERGOCALCIFEROL 1.25 MG/1
50000 CAPSULE ORAL WEEKLY
Qty: 8 CAPSULE | Refills: 0 | Status: SHIPPED | OUTPATIENT
Start: 2023-10-02

## 2023-11-06 ENCOUNTER — OFFICE VISIT (OUTPATIENT)
Dept: INTERNAL MEDICINE | Facility: CLINIC | Age: 58
End: 2023-11-06
Payer: MEDICAID

## 2023-11-06 VITALS
DIASTOLIC BLOOD PRESSURE: 68 MMHG | TEMPERATURE: 97.5 F | SYSTOLIC BLOOD PRESSURE: 138 MMHG | OXYGEN SATURATION: 99 % | RESPIRATION RATE: 18 BRPM | BODY MASS INDEX: 29.99 KG/M2 | HEART RATE: 76 BPM | WEIGHT: 180 LBS | HEIGHT: 65 IN

## 2023-11-06 DIAGNOSIS — F41.9 ANXIETY: ICD-10-CM

## 2023-11-06 DIAGNOSIS — R10.11 RIGHT UPPER QUADRANT ABDOMINAL PAIN: ICD-10-CM

## 2023-11-06 DIAGNOSIS — R79.89 ELEVATED LFTS: ICD-10-CM

## 2023-11-06 DIAGNOSIS — Z00.00 ENCOUNTER FOR ANNUAL PHYSICAL EXAM: Primary | ICD-10-CM

## 2023-11-06 DIAGNOSIS — R05.1 ACUTE COUGH: ICD-10-CM

## 2023-11-06 DIAGNOSIS — Z12.31 ENCOUNTER FOR SCREENING MAMMOGRAM FOR MALIGNANT NEOPLASM OF BREAST: ICD-10-CM

## 2023-11-06 DIAGNOSIS — G89.29 CHRONIC MIDLINE LOW BACK PAIN WITHOUT SCIATICA: ICD-10-CM

## 2023-11-06 DIAGNOSIS — M54.50 CHRONIC MIDLINE LOW BACK PAIN WITHOUT SCIATICA: ICD-10-CM

## 2023-11-06 DIAGNOSIS — M54.6 LOWER THORACIC BACK PAIN: ICD-10-CM

## 2023-11-06 DIAGNOSIS — Z12.11 ENCOUNTER FOR SCREENING FOR MALIGNANT NEOPLASM OF COLON: ICD-10-CM

## 2023-11-06 RX ORDER — NICOTINE 21 MG/24HR
1 PATCH, TRANSDERMAL 24 HOURS TRANSDERMAL EVERY 24 HOURS
Qty: 28 EACH | Refills: 3 | Status: SHIPPED | OUTPATIENT
Start: 2023-11-06

## 2023-11-06 RX ORDER — GUAIFENESIN/DEXTROMETHORPHAN 100-10MG/5
5 SYRUP ORAL 3 TIMES DAILY PRN
Qty: 473 ML | Refills: 0 | Status: SHIPPED | OUTPATIENT
Start: 2023-11-06

## 2023-11-06 NOTE — PROGRESS NOTES
Annual Physical Exam     Name: Nathalia Rao     : 1965     MRN: [id number]    Chief Complaint  Annual Exam    Subjective     Subjective          History of Present Illness  Nathalia Rao presents to [facility] for a routine physical.  History of Present Illness    Anxiety- taking wellbutrin, states she feels like this has helped with her mood.  Has not been taking prescribed BuSpar, was concerned it may cause her to feel sleepy.  Patient asking if she can take it.    Vitamin D deficiency-Labs from  showed vitamin D deficiency. patient reports taking high dose vit D and is feeling much better.    LFTs elevated- hx of hep C does get occasional RUQ pain and has some loss of appetite.  Has some gradual weight loss but does report taking dietary changes. states she was treated for hep C and last checked was undetectable.    Low back pain-had surgery greater than 10 years ago, states had rods placed in her back, was told by orthopedist and [place] that part of her hardware was broken, never had this evaluated.  Continues to have lower back pain but states it is now moving up to thoracic spine.  Reports having sensation changes in her feet, states when she walks it feels like her feet are wet.      Reports respiratory symptoms for the past 2 months. States she is feeling better but continues to have productive cough.  Denies fever/chills, sore throat, chest pain or shortness of breath.    Diet/exercise: making positive changes, has cut down sugar intake. Has lost 60 lbs within the last year. Drinks a lot of coke, doesn't drink much water. Does not exercise much  Eye exams: Due for eye exam, wears glasses  Dental exams: Due for dental exam, has not been in several years.    PHQ-2 Depression Screening  Little interest or pleasure in doing things? 0-->not at all   Feeling down, depressed, or hopeless? 0-->not at all   PHQ-2 Total Score 0       No LMP recorded. Patient has  had a hysterectomy.   reports being sexually active and has had partner(s) who are female. She reports using the following method of birth control/protection: None.  Cancer-related family history includes Cancer in her father and mother; Colon cancer in her father; Stomach cancer in her father; Uterine cancer in her mother.    Mammogram- due, will order today  Pap- scheduled for 1/2024  Colonoscopy- due, will order today  Lung CA Screen- scheduled for 11/16/2023  Shingrix- will get at pharmacy  COVID vaccine- declined     reports that she has been smoking cigarettes. She has a 2.50 pack-year smoking history. She has been exposed to tobacco smoke. She has never used smokeless tobacco.     Health Maintenance   Topic Date Due    MAMMOGRAM  Never done    COLORECTAL CANCER SCREENING  Never done    Pneumococcal Vaccine 0-64 (1 - PCV) Never done    TDAP/TD VACCINES (1 - Tdap) Never done    COVID-19 Vaccine (1) 11/09/2023 (Originally 1965)    PAP SMEAR  12/30/2023 (Originally 6/14/2016)    ZOSTER VACCINE (1 of 2) 11/07/2024 (Originally 5/10/2013)    LIPID PANEL  09/27/2024    BMI FOLLOWUP  09/27/2024    ANNUAL PHYSICAL  11/06/2024    HEPATITIS C SCREENING  Completed    INFLUENZA VACCINE  Completed    Hepatitis B  Aged Out       Immunization History   Administered Date(s) Administered    Fluzone (or Fluarix & Flulaval for VFC) >6mos 11/06/2023    Influenza, Unspecified 11/06/2014       Review of Systems   Constitutional:  Negative for chills and fever.   HENT: Negative.     Eyes: Negative.    Respiratory:  Positive for cough. Negative for shortness of breath and wheezing.    Cardiovascular:  Negative for chest pain and leg swelling.   Gastrointestinal: Negative.    Endocrine: Negative.    Genitourinary: Negative.    Musculoskeletal:  Positive for back pain.   Allergic/Immunologic: Negative.    Neurological:  Positive for numbness (bilateral feet). Negative for dizziness, weakness and headaches.   Hematological:  Negative.    Psychiatric/Behavioral: Negative.          The following portions of the patient's history were reviewed and updated as appropriate: allergies, current medications, past family history, past medical history, past social history, past surgical history and problem list.    Patient Active Problem List   Diagnosis    Hepatitis C    Seborrheic keratoses    PTSD (post-traumatic stress disorder)    Depression    Anxiety    Scoliosis of thoracic spine    Bulging of thoracic intervertebral disc    Bulging lumbar disc    Well woman exam     No Known Allergies    Current Outpatient Medications:     buPROPion XL (Wellbutrin XL) 300 MG 24 hr tablet, Take 1 tablet by mouth Daily., Disp: 30 tablet, Rfl: 5    fluticasone (FLONASE) 50 MCG/ACT nasal spray, 2 sprays into the nostril(s) as directed by provider Daily., Disp: 1 bottle, Rfl: 0    vitamin D (ERGOCALCIFEROL) 1.25 MG (09824 UT) capsule capsule, Take 1 capsule by mouth 1 (One) Time Per Week., Disp: 8 capsule, Rfl: 0    busPIRone (BUSPAR) 5 MG tablet, Take 1 tablet by mouth 3 (Three) Times a Day. (Patient not taking: Reported on 11/6/2023), Disp: 90 tablet, Rfl: 3    guaiFENesin-dextromethorphan (ROBITUSSIN DM) 100-10 MG/5ML syrup, Take 5 mL by mouth 3 (Three) Times a Day As Needed for Cough., Disp: 473 mL, Rfl: 0    nicotine (NICODERM CQ) 14 MG/24HR patch, Place 1 patch on the skin as directed by provider Daily., Disp: 28 each, Rfl: 3  New Medications Ordered This Visit   Medications    nicotine (NICODERM CQ) 14 MG/24HR patch     Sig: Place 1 patch on the skin as directed by provider Daily.     Dispense:  28 each     Refill:  3    guaiFENesin-dextromethorphan (ROBITUSSIN DM) 100-10 MG/5ML syrup     Sig: Take 5 mL by mouth 3 (Three) Times a Day As Needed for Cough.     Dispense:  473 mL     Refill:  0     Past Medical History:   Diagnosis Date    ADHD (attention deficit hyperactivity disorder) 20 years ago    Anxiety     Arthritis About 10 years ago    Getting  worse    Bulging lumbar disc     Bulging of thoracic intervertebral disc     Depression     Fibromyalgia     Fibromyalgia, primary About 6 years ago    H/O pulmonary function tests 2019    no obstruction, moderate restriction    Hepatitis C     peg interferon, ribaviron    Hyperlipidemia 6 years ago    Hypertension     Injury of back     Irritable bowel syndrome 30 years ago    Low back pain     Progressively worse    Murmur, cardiac     Neuropathy     PTSD (post-traumatic stress disorder)     Raynaud's disease     Scoliosis     Shingles       Past Surgical History:   Procedure Laterality Date    BACK SURGERY      CHOLECYSTECTOMY      COLONOSCOPY      patient states twisted colon    SPINAL FUSION      TOTAL ABDOMINAL HYSTERECTOMY WITH SALPINGO OOPHORECTOMY  2014    ovarian cysts      Family History   Problem Relation Age of Onset    Uterine cancer Mother     Hypertension Mother     Hyperlipidemia Mother     Anxiety disorder Mother     Cancer Mother     Depression Mother     Heart disease Mother     Mental illness Mother     Stomach cancer Father     Colon cancer Father     Inflammatory bowel disease Father     Alcohol abuse Father     Cancer Father     Mental illness Father     Diabetes Maternal Grandmother     Hypertension Maternal Grandmother     Hyperlipidemia Maternal Grandmother     Stroke Maternal Grandmother     Drug abuse Maternal Grandmother     Mental illness Maternal Grandmother     Heart attack Maternal Grandfather     Hypertension Maternal Grandfather       Social History     Socioeconomic History    Marital status:    Tobacco Use    Smoking status: Every Day     Packs/day: 0.50     Years: 5.00     Additional pack years: 0.00     Total pack years: 2.50     Types: Cigarettes     Last attempt to quit: 2018     Years since quittin.8     Passive exposure: Current    Smokeless tobacco: Never   Vaping Use    Vaping Use: Some days    Passive vaping exposure: Yes  "  Substance and Sexual Activity    Alcohol use: No    Drug use: Not Currently     Types: Opium, Oxycodone, Other     Comment: opiotes - sobriety date 3/8/1989    Sexual activity: Yes     Partners: Female     Birth control/protection: None        Objective     Objective   Vital Signs:   Vitals:    11/06/23 1518   BP: 138/68   BP Location: Right arm   Patient Position: Sitting   Cuff Size: Adult   Pulse: 76   Resp: 18   Temp: 97.5 °F (36.4 °C)   TempSrc: Temporal   SpO2: 99%   Weight: 81.6 kg (180 lb)   Height: 165.1 cm (65\")   PainSc: 0-No pain      Body mass index is 29.95 kg/m².       Physical Exam  Constitutional:       Appearance: Normal appearance. She is not ill-appearing.   HENT:      Head: Normocephalic and atraumatic.      Right Ear: Tympanic membrane, ear canal and external ear normal. There is no impacted cerumen.      Left Ear: Tympanic membrane, ear canal and external ear normal. There is no impacted cerumen.      Nose: Nose normal. No congestion or rhinorrhea.      Mouth/Throat:      Mouth: Mucous membranes are moist.      Pharynx: Oropharynx is clear. No oropharyngeal exudate or posterior oropharyngeal erythema.      Tonsils: No tonsillar exudate or tonsillar abscesses. 0 on the right. 0 on the left.   Neck:      Thyroid: No thyroid mass, thyromegaly or thyroid tenderness.   Cardiovascular:      Rate and Rhythm: Normal rate and regular rhythm.      Pulses: Normal pulses.           Radial pulses are 2+ on the right side and 2+ on the left side.      Heart sounds: Normal heart sounds, S1 normal and S2 normal.   Pulmonary:      Effort: Pulmonary effort is normal. No respiratory distress.      Breath sounds: Normal breath sounds and air entry. No decreased breath sounds, wheezing or rhonchi.   Abdominal:      General: Bowel sounds are normal.      Palpations: Abdomen is soft.      Tenderness: There is no abdominal tenderness.   Musculoskeletal:      Thoracic back: Spasms and tenderness present. Scoliosis " present.      Lumbar back: Spasms and tenderness present.      Right lower leg: No edema.      Left lower leg: No edema.   Lymphadenopathy:      Cervical: No cervical adenopathy.   Skin:     General: Skin is warm and dry.      Capillary Refill: Capillary refill takes less than 2 seconds.   Neurological:      General: No focal deficit present.      Mental Status: She is alert and oriented to person, place, and time. Mental status is at baseline.   Psychiatric:         Mood and Affect: Mood normal.         Behavior: Behavior normal.         Thought Content: Thought content normal.         Judgment: Judgment normal.              Assessment and Plan        Assessment and Plan    Diagnoses and all orders for this visit:    1. Encounter for annual physical exam (Primary)  -     Ambulatory Referral For Screening Colonoscopy  -     Mammo Screening Digital Tomosynthesis Bilateral With CAD; Future  -     Fluzone >6 Months ([id number])    2. Right upper quadrant abdominal pain  -     US Liver; Future    3. Elevated LFTs  -     US Liver; Future    4. Chronic midline low back pain without sciatica  -     Ambulatory Referral to Pain Management  -     MRI Thoracic Spine Without Contrast; Future  -     MRI Lumbar Spine With & Without Contrast; Future    5. Encounter for screening mammogram for malignant neoplasm of breast  -     Mammo Screening Digital Tomosynthesis Bilateral With CAD; Future    6. Encounter for screening for malignant neoplasm of colon  -     Ambulatory Referral For Screening Colonoscopy    7. Lower thoracic back pain  -     Ambulatory Referral to Pain Management  -     MRI Thoracic Spine Without Contrast; Future    8. Acute cough  -     guaiFENesin-dextromethorphan (ROBITUSSIN DM) 100-10 MG/5ML syrup; Take 5 mL by mouth 3 (Three) Times a Day As Needed for Cough.  Dispense: 473 mL; Refill: 0    Other orders  -     nicotine (NICODERM CQ) 14 MG/24HR patch; Place 1 patch on the skin as directed by provider Daily.   Dispense: 28 each; Refill: 3      -Mammogram and colonoscopy ordered  -Referral to pain management sent, will order MRI of T-spine and L-spine  -Flu shot administered today  -We will order liver ultrasound for abnormal liver function test  -Patient encouraged to quit smoking, will order nicotine patch.  -Continue to monitor BP at home, instructed to cut back on salt intake and continue to make healthy changes to diet.      Follow Up   Return in about 6 months (around 5/6/2024).    Counseled on health maintenance topics and preventative care recommendations. Follow up yearly for routine physical exam. Diet and exercise counseling given. See dentist and eye doctor yearly as directed.    If a referral was made please contact our office if you have not heard about an appointment in the next 2 weeks.   If labs or images are ordered we will contact you with the results within the next week.  If you have not heard from us after a week please call our office to inquire about the results.    JUAN DIEGO Mukherjee    Patient was given instructions and counseling regarding her condition or for health maintenance advice. Please see specific information pulled into the AVS if appropriate.     * Please note that portions of this note were completed with a voice recognition program.

## 2023-12-04 ENCOUNTER — TELEPHONE (OUTPATIENT)
Dept: INTERNAL MEDICINE | Facility: CLINIC | Age: 58
End: 2023-12-04
Payer: MEDICAID

## 2023-12-04 DIAGNOSIS — M54.50 CHRONIC MIDLINE LOW BACK PAIN WITHOUT SCIATICA: Primary | ICD-10-CM

## 2023-12-04 DIAGNOSIS — M54.6 LOWER THORACIC BACK PAIN: ICD-10-CM

## 2023-12-04 DIAGNOSIS — G89.29 CHRONIC MIDLINE LOW BACK PAIN WITHOUT SCIATICA: Primary | ICD-10-CM

## 2023-12-04 NOTE — TELEPHONE ENCOUNTER
----- Message from JUAN DIEGO Pérez sent at 12/4/2023  8:14 AM EST -----  Please let Nathalia know that Pain Management won't see her until we get a MRI first, so I am going to order an MRI for her but she will most likely have to try physical therapy first before getting MRI covered. We will see what we can do but these are her options before getting into Pain Management.

## 2024-01-24 ENCOUNTER — LAB (OUTPATIENT)
Dept: INTERNAL MEDICINE | Facility: CLINIC | Age: 59
End: 2024-01-24
Payer: MEDICAID

## 2024-01-24 ENCOUNTER — OFFICE VISIT (OUTPATIENT)
Dept: INTERNAL MEDICINE | Facility: CLINIC | Age: 59
End: 2024-01-24
Payer: MEDICAID

## 2024-01-24 VITALS
BODY MASS INDEX: 28.35 KG/M2 | SYSTOLIC BLOOD PRESSURE: 145 MMHG | HEART RATE: 76 BPM | OXYGEN SATURATION: 99 % | HEIGHT: 66 IN | WEIGHT: 176.4 LBS | TEMPERATURE: 97.7 F | DIASTOLIC BLOOD PRESSURE: 70 MMHG

## 2024-01-24 DIAGNOSIS — M54.2 NECK PAIN: ICD-10-CM

## 2024-01-24 DIAGNOSIS — F41.9 ANXIETY: ICD-10-CM

## 2024-01-24 DIAGNOSIS — J02.9 SORE THROAT: Primary | ICD-10-CM

## 2024-01-24 DIAGNOSIS — R22.1 LOCALIZED SWELLING, MASS AND LUMP, NECK: ICD-10-CM

## 2024-01-24 LAB
ANION GAP SERPL CALCULATED.3IONS-SCNC: 12.4 MMOL/L (ref 5–15)
BUN SERPL-MCNC: 5 MG/DL (ref 8–23)
BUN/CREAT SERPL: 6 (ref 7–25)
CALCIUM SPEC-SCNC: 9.8 MG/DL (ref 8.6–10.5)
CHLORIDE SERPL-SCNC: 102 MMOL/L (ref 98–107)
CO2 SERPL-SCNC: 25.6 MMOL/L (ref 22–29)
CREAT SERPL-MCNC: 0.84 MG/DL (ref 0.57–1)
DEPRECATED RDW RBC AUTO: 46.1 FL (ref 37–54)
EGFRCR SERPLBLD CKD-EPI 2021: 79.7 ML/MIN/1.73
ERYTHROCYTE [DISTWIDTH] IN BLOOD BY AUTOMATED COUNT: 13 % (ref 12.3–15.4)
EXPIRATION DATE: NORMAL
EXPIRATION DATE: NORMAL
FLUAV RNA RESP QL NAA+PROBE: NOT DETECTED
FLUBV RNA RESP QL NAA+PROBE: NOT DETECTED
GLUCOSE SERPL-MCNC: 130 MG/DL (ref 65–99)
HCT VFR BLD AUTO: 40.3 % (ref 34–46.6)
HGB BLD-MCNC: 14.1 G/DL (ref 12–15.9)
INTERNAL CONTROL: NORMAL
INTERNAL CONTROL: NORMAL
Lab: NORMAL
Lab: NORMAL
MCH RBC QN AUTO: 34.2 PG (ref 26.6–33)
MCHC RBC AUTO-ENTMCNC: 35 G/DL (ref 31.5–35.7)
MCV RBC AUTO: 97.8 FL (ref 79–97)
PLATELET # BLD AUTO: 160 10*3/MM3 (ref 140–450)
PMV BLD AUTO: 10.3 FL (ref 6–12)
POTASSIUM SERPL-SCNC: 3.9 MMOL/L (ref 3.5–5.2)
RBC # BLD AUTO: 4.12 10*6/MM3 (ref 3.77–5.28)
S PYO AG THROAT QL: NEGATIVE
SARS-COV-2 RNA RESP QL NAA+PROBE: NOT DETECTED
SODIUM SERPL-SCNC: 140 MMOL/L (ref 136–145)
TSH SERPL DL<=0.05 MIU/L-ACNC: 2.04 UIU/ML (ref 0.27–4.2)
WBC NRBC COR # BLD AUTO: 6.66 10*3/MM3 (ref 3.4–10.8)

## 2024-01-24 PROCEDURE — 86664 EPSTEIN-BARR NUCLEAR ANTIGEN: CPT

## 2024-01-24 PROCEDURE — 87880 STREP A ASSAY W/OPTIC: CPT

## 2024-01-24 PROCEDURE — 85027 COMPLETE CBC AUTOMATED: CPT

## 2024-01-24 PROCEDURE — 84443 ASSAY THYROID STIM HORMONE: CPT

## 2024-01-24 PROCEDURE — 99214 OFFICE O/P EST MOD 30 MIN: CPT

## 2024-01-24 PROCEDURE — 36415 COLL VENOUS BLD VENIPUNCTURE: CPT

## 2024-01-24 PROCEDURE — 80048 BASIC METABOLIC PNL TOTAL CA: CPT

## 2024-01-24 PROCEDURE — 86665 EPSTEIN-BARR CAPSID VCA: CPT

## 2024-01-24 RX ORDER — HYDROXYZINE HYDROCHLORIDE 25 MG/1
25 TABLET, FILM COATED ORAL EVERY 6 HOURS PRN
Qty: 30 TABLET | Refills: 5 | Status: SHIPPED | OUTPATIENT
Start: 2024-01-24

## 2024-01-24 RX ORDER — METHYLPREDNISOLONE 4 MG/1
TABLET ORAL
Qty: 1 EACH | Refills: 0 | Status: SHIPPED | OUTPATIENT
Start: 2024-01-24

## 2024-01-24 NOTE — PROGRESS NOTES
"    Office Note     Name: Nathalia Rao    : 1965     MRN: [id number]     Chief Complaint  Neck Pain (Patient in clinic experiencing neck pain on the left side for several days with no relief, patient noted she has been experiencing headache and nausea as well so patient was swabbed for COVID,FLU and STREP. Patient also states she has family history of cancer and she is concerned about that )    Subjective     History of Present Illness:  Nathalia Rao is a 58 y.o. female who presents today for c/o neck pain/soreness for a couple of weeks. Has developed \"knot\" and swelling on left side of her neck over the last 5-6 days. Also reports numbness and tingling down the left arm that has developed. She says she has had intermittent congestion, cough, fatigue and headaches since she last had covid. Unknown if she has had fever because she doesn't have thermometer at home but says she has had some chills.    Anxiety- requesting to switch to hydroxyzine. She is currently taking buspar and does not think it is helping much.     Review of Systems    Past Medical History:   Diagnosis Date    ADHD (attention deficit hyperactivity disorder) 20 years ago    Anxiety     Arthritis About 10 years ago    Getting worse    Bulging lumbar disc     Bulging of thoracic intervertebral disc     Depression     Fibromyalgia     Fibromyalgia, primary About 6 years ago    H/O pulmonary function tests 2019    no obstruction, moderate restriction    Hepatitis C     peg interferon, ribaviron    Hyperlipidemia 6 years ago    Hypertension     Injury of back     Irritable bowel syndrome 30 years ago    Low back pain     Progressively worse    Murmur, cardiac     Neuropathy     PTSD (post-traumatic stress disorder)     Raynaud's disease     Scoliosis     Shingles      Past Surgical History:   Procedure Laterality Date    BACK SURGERY      CHOLECYSTECTOMY      COLONOSCOPY      patient states twisted colon    SPINAL " "FUSION  2006    TOTAL ABDOMINAL HYSTERECTOMY WITH SALPINGO OOPHORECTOMY  11/2014    ovarian cysts       Current Outpatient Medications:     buPROPion XL (Wellbutrin XL) 300 MG 24 hr tablet, Take 1 tablet by mouth Daily., Disp: 30 tablet, Rfl: 5    nicotine (NICODERM CQ) 14 MG/24HR patch, Place 1 patch on the skin as directed by provider Daily., Disp: 28 each, Rfl: 3    hydrOXYzine (ATARAX) 25 MG tablet, Take 1 tablet by mouth Every 6 (Six) Hours As Needed for Anxiety., Disp: 30 tablet, Rfl: 5    methylPREDNISolone (MEDROL) 4 MG dose pack, Take as directed on package instructions., Disp: 1 each, Rfl: 0  No Known Allergies    Objective     Vital Signs  /70   Pulse 76   Temp 97.7 °F (36.5 °C)   Ht 166.4 cm (65.51\")   Wt 80 kg (176 lb 6.4 oz)   SpO2 99%   BMI 28.90 kg/m²   Estimated body mass index is 28.9 kg/m² as calculated from the following:    Height as of this encounter: 166.4 cm (65.51\").    Weight as of this encounter: 80 kg (176 lb 6.4 oz).            Physical Exam  Vitals reviewed.   Constitutional:       Appearance: Normal appearance. She is not ill-appearing.   HENT:      Head: Normocephalic and atraumatic.      Right Ear: Tympanic membrane, ear canal and external ear normal. There is no impacted cerumen.      Left Ear: Tympanic membrane, ear canal and external ear normal. There is no impacted cerumen.      Nose: Nose normal. No congestion or rhinorrhea.      Mouth/Throat:      Mouth: Mucous membranes are moist.      Pharynx: Oropharynx is clear. Uvula midline. No oropharyngeal exudate or posterior oropharyngeal erythema.      Tonsils: No tonsillar exudate or tonsillar abscesses. 0 on the right. 0 on the left.   Eyes:      Extraocular Movements: Extraocular movements intact.      Conjunctiva/sclera: Conjunctivae normal.      Pupils: Pupils are equal, round, and reactive to light.   Neck:      Thyroid: No thyroid mass, thyromegaly or thyroid tenderness.   Cardiovascular:      Rate and Rhythm: " Normal rate and regular rhythm.      Pulses: Normal pulses.           Radial pulses are 2+ on the right side and 2+ on the left side.      Heart sounds: Normal heart sounds, S1 normal and S2 normal. No murmur heard.  Pulmonary:      Effort: Pulmonary effort is normal. No tachypnea.      Breath sounds: Normal breath sounds and air entry. No decreased breath sounds, wheezing or rhonchi.   Abdominal:      General: Abdomen is flat. Bowel sounds are normal.      Palpations: Abdomen is soft.      Tenderness: There is no abdominal tenderness. There is no guarding or rebound.   Musculoskeletal:      Cervical back: Edema present.      Right lower leg: No edema.      Left lower leg: No edema.   Lymphadenopathy:      Cervical:      Left cervical: Posterior cervical adenopathy present.   Skin:     General: Skin is warm and dry.      Capillary Refill: Capillary refill takes less than 2 seconds.   Neurological:      General: No focal deficit present.      Mental Status: She is alert and oriented to person, place, and time. Mental status is at baseline.      Sensory: Sensation is intact.      Motor: Motor function is intact.      Coordination: Coordination is intact.      Gait: Gait is intact.   Psychiatric:         Attention and Perception: Attention and perception normal.         Mood and Affect: Mood and affect normal.         Speech: Speech normal.         Behavior: Behavior normal. Behavior is cooperative.         Thought Content: Thought content normal.         Cognition and Memory: Cognition and memory normal.         Judgment: Judgment normal.          Results for orders placed or performed in visit on 01/24/24   Covid-19 + Flu A&B AG, Veritor    Specimen: Nasal Cavity; Swab   Result Value Ref Range    COVID19 Not Detected Not Detected - Ref. Range    POC Influenza A, Molecular Not Detected Negative / Not Detected    POC Influenza B, Molecular Not Detected Negative / Not Detected    Internal Control Passed Passed    Lot  Number [id number]     Expiration Date 02/05/2025    POCT rapid strep A    Specimen: Swab   Result Value Ref Range    Rapid Strep A Screen Negative Negative, VALID, INVALID, Not Performed    Internal Control Passed Passed    Lot Number [id number]     Expiration Date 01/01/2025               Assessment and Plan     Diagnoses and all orders for this visit:    1. Sore throat (Primary)  -     Covid-19 + Flu A&B AG, Veritor  -     POCT rapid strep A    2. Localized swelling, mass and lump, neck  -     Basic metabolic panel; Future  -     CBC No Differential; Future  -     TSH Rfx On Abnormal To Free T4; Future  -     US Head Neck Soft Tissue; Future  -     methylPREDNISolone (MEDROL) 4 MG dose pack; Take as directed on package instructions.  Dispense: 1 each; Refill: 0  -     EBV Antibody Profile; Future    3. Neck pain  -     Basic metabolic panel; Future  -     CBC No Differential; Future  -     TSH Rfx On Abnormal To Free T4; Future  -     US Head Neck Soft Tissue; Future  -     methylPREDNISolone (MEDROL) 4 MG dose pack; Take as directed on package instructions.  Dispense: 1 each; Refill: 0    4. Anxiety  -     hydrOXYzine (ATARAX) 25 MG tablet; Take 1 tablet by mouth Every 6 (Six) Hours As Needed for Anxiety.  Dispense: 30 tablet; Refill: 5    -POC flu/COVID, strep negative  -Labs pending  -Patient does have lymphadenopathy to left side of neck.  Will order ultrasound of neck.  Steroids sent to pharmacy.  Told patient would let her know if she needs to take steroids based on lab results  -Patient instructed to stop taking BuSpar.  We will start hydroxyzine 25 mg at night for anxiety.  -F/u 1 week    If a referral was made please contact our office if you have not heard about an appointment in the next 2 weeks.   If labs or images are ordered we will contact you with the results within the next week.  If you have not heard from us after a week please call our office to inquire about the results.    Follow Up  Return in  about 1 week (around 1/31/2024).    JUAN DIEGO Mukherjee  Answers submitted by the patient for this visit:  Other (Submitted on 1/23/2024)  Please describe your symptoms.: I have swelling on the side of my neck underneath my ear it hurts and the swelling gotten worse  Have you had these symptoms before?: No  How long have you been having these symptoms?: 1-4 days  Please list any medications you are currently taking for this condition.: None  Please describe any probable cause for these symptoms. : I dont know lily been sick alot with upper respiratory and cough and flu like symptoms  Primary Reason for Visit (Submitted on 1/23/2024)  What is the primary reason for your visit?: Other

## 2024-01-25 LAB
EBV NA IGG SER IA-ACNC: >600 U/ML (ref 0–17.9)
EBV VCA IGG SER IA-ACNC: >600 U/ML (ref 0–17.9)
EBV VCA IGM SER IA-ACNC: <36 U/ML (ref 0–35.9)
SERVICE CMNT-IMP: ABNORMAL

## 2024-01-29 ENCOUNTER — HOSPITAL ENCOUNTER (OUTPATIENT)
Dept: ULTRASOUND IMAGING | Facility: HOSPITAL | Age: 59
Discharge: HOME | End: 2024-01-29
Payer: MEDICAID

## 2024-01-29 DIAGNOSIS — R22.1 LOCALIZED SWELLING, MASS AND LUMP, NECK: ICD-10-CM

## 2024-01-29 DIAGNOSIS — M54.2 NECK PAIN: ICD-10-CM

## 2024-01-29 PROCEDURE — 76536 US EXAM OF HEAD AND NECK: CPT

## 2024-01-31 ENCOUNTER — OFFICE VISIT (OUTPATIENT)
Dept: INTERNAL MEDICINE | Facility: CLINIC | Age: 59
End: 2024-01-31
Payer: MEDICAID

## 2024-01-31 VITALS
BODY MASS INDEX: 28.73 KG/M2 | WEIGHT: 178.8 LBS | TEMPERATURE: 96.9 F | DIASTOLIC BLOOD PRESSURE: 60 MMHG | HEART RATE: 88 BPM | SYSTOLIC BLOOD PRESSURE: 142 MMHG | OXYGEN SATURATION: 98 % | HEIGHT: 66 IN

## 2024-01-31 DIAGNOSIS — M54.2 ACUTE NECK PAIN: Primary | ICD-10-CM

## 2024-01-31 PROCEDURE — 1160F RVW MEDS BY RX/DR IN RCRD: CPT

## 2024-01-31 PROCEDURE — 1159F MED LIST DOCD IN RCRD: CPT

## 2024-01-31 PROCEDURE — 99213 OFFICE O/P EST LOW 20 MIN: CPT

## 2024-01-31 RX ORDER — CYCLOBENZAPRINE HCL 5 MG
5-10 TABLET ORAL 2 TIMES DAILY PRN
Qty: 30 TABLET | Refills: 1 | Status: SHIPPED | OUTPATIENT
Start: 2024-01-31

## 2024-01-31 NOTE — PROGRESS NOTES
Office Note     Name: Nathalia Rao    : 1965     MRN: [id number]     Chief Complaint  Shoulder Pain (Patient in clinic for recurrent left shoulder pain , patient states she is experiencing burning and discomfort from the top of the shoulder and radiates down her shoulder and into her upper back )    Subjective     History of Present Illness:  Nathalia Rao is a 58 y.o. female who presents today for f/u on neck pain. Says the swelling and knots on the left side of her neck have improved but she continues to have neck pain/tightness. C/o burning pain in the left side of her neck that radiates down to her upper back. Denies any acute numbness/tingling to left arm. Says she has chronic numbness to her hands.     She denies trauma/injury.    Reviewed results of soft tissue US of the neck with patient which was normal.      Review of Systems    Past Medical History:   Diagnosis Date    ADHD (attention deficit hyperactivity disorder) 20 years ago    Anxiety     Arthritis About 10 years ago    Getting worse    Bulging lumbar disc     Bulging of thoracic intervertebral disc     Depression     Fibromyalgia     Fibromyalgia, primary About 6 years ago    H/O pulmonary function tests 2019    no obstruction, moderate restriction    Hepatitis C     peg interferon, ribaviron    Hyperlipidemia 6 years ago    Hypertension     Injury of back     Irritable bowel syndrome 30 years ago    Low back pain 2003    Progressively worse    Murmur, cardiac     Neuropathy     PTSD (post-traumatic stress disorder)     Raynaud's disease     Scoliosis     Shingles      Past Surgical History:   Procedure Laterality Date    BACK SURGERY      CHOLECYSTECTOMY      COLONOSCOPY      patient states twisted colon    SPINAL FUSION  2006    TOTAL ABDOMINAL HYSTERECTOMY WITH SALPINGO OOPHORECTOMY  2014    ovarian cysts       Current Outpatient Medications:     buPROPion XL (Wellbutrin XL) 300 MG 24 hr tablet, Take 1 tablet  "by mouth Daily., Disp: 30 tablet, Rfl: 5    hydrOXYzine (ATARAX) 25 MG tablet, Take 1 tablet by mouth Every 6 (Six) Hours As Needed for Anxiety., Disp: 30 tablet, Rfl: 5    nicotine (NICODERM CQ) 14 MG/24HR patch, Place 1 patch on the skin as directed by provider Daily., Disp: 28 each, Rfl: 3    cyclobenzaprine (FLEXERIL) 5 MG tablet, Take 1-2 tablets by mouth 2 (Two) Times a Day As Needed for Muscle Spasms., Disp: 30 tablet, Rfl: 1    diclofenac (VOLTAREN) 50 MG EC tablet, Take 1 tablet by mouth 2 (Two) Times a Day As Needed (neck pain)., Disp: 60 tablet, Rfl: 1    methylPREDNISolone (MEDROL) 4 MG dose pack, Take as directed on package instructions. (Patient not taking: Reported on 1/31/2024), Disp: 1 each, Rfl: 0  No Known Allergies    Objective     Vital Signs  /60   Pulse 88   Temp 96.9 °F (36.1 °C)   Ht 166.4 cm (65.51\")   Wt 81.1 kg (178 lb 12.8 oz)   SpO2 98%   BMI 29.29 kg/m²   Estimated body mass index is 29.29 kg/m² as calculated from the following:    Height as of this encounter: 166.4 cm (65.51\").    Weight as of this encounter: 81.1 kg (178 lb 12.8 oz).            Physical Exam  Vitals reviewed.   Constitutional:       Appearance: Normal appearance. She is not ill-appearing.   HENT:      Head: Normocephalic and atraumatic.      Right Ear: Tympanic membrane, ear canal and external ear normal. There is no impacted cerumen.      Left Ear: Tympanic membrane, ear canal and external ear normal. There is no impacted cerumen.      Nose: Nose normal. No congestion or rhinorrhea.      Mouth/Throat:      Mouth: Mucous membranes are moist.      Pharynx: Oropharynx is clear. Uvula midline. No oropharyngeal exudate or posterior oropharyngeal erythema.      Tonsils: No tonsillar exudate or tonsillar abscesses. 0 on the right. 0 on the left.   Eyes:      Extraocular Movements: Extraocular movements intact.      Conjunctiva/sclera: Conjunctivae normal.      Pupils: Pupils are equal, round, and reactive to " light.   Neck:      Thyroid: No thyroid mass, thyromegaly or thyroid tenderness.      Vascular: No carotid bruit.      Trachea: Trachea normal.   Cardiovascular:      Rate and Rhythm: Normal rate and regular rhythm.      Pulses: Normal pulses.           Radial pulses are 2+ on the right side and 2+ on the left side.      Heart sounds: Normal heart sounds, S1 normal and S2 normal. No murmur heard.  Pulmonary:      Effort: Pulmonary effort is normal. No tachypnea.      Breath sounds: Normal breath sounds and air entry. No decreased breath sounds, wheezing or rhonchi.   Abdominal:      General: Abdomen is flat. Bowel sounds are normal.      Palpations: Abdomen is soft.      Tenderness: There is no abdominal tenderness. There is no guarding or rebound.   Musculoskeletal:      Cervical back: Rigidity and tenderness present. No edema or erythema. Pain with movement and muscular tenderness present. Decreased range of motion.      Right lower leg: No edema.      Left lower leg: No edema.   Lymphadenopathy:      Cervical: No cervical adenopathy.   Skin:     General: Skin is warm and dry.      Capillary Refill: Capillary refill takes less than 2 seconds.   Neurological:      General: No focal deficit present.      Mental Status: She is alert and oriented to person, place, and time. Mental status is at baseline.      Sensory: Sensation is intact. No sensory deficit.      Motor: Motor function is intact. No weakness.      Coordination: Coordination is intact.      Gait: Gait is intact.   Psychiatric:         Attention and Perception: Attention and perception normal.         Mood and Affect: Mood and affect normal.         Speech: Speech normal.         Behavior: Behavior normal. Behavior is cooperative.         Thought Content: Thought content normal.         Cognition and Memory: Cognition and memory normal.         Judgment: Judgment normal.               Assessment and Plan     Diagnoses and all orders for this visit:    1.  Acute neck pain (Primary)  -     cyclobenzaprine (FLEXERIL) 5 MG tablet; Take 1-2 tablets by mouth 2 (Two) Times a Day As Needed for Muscle Spasms.  Dispense: 30 tablet; Refill: 1  -     diclofenac (VOLTAREN) 50 MG EC tablet; Take 1 tablet by mouth 2 (Two) Times a Day As Needed (neck pain).  Dispense: 60 tablet; Refill: 1    -will send muscle relaxant and NSAID to pharmacy to help with pain.  -pt instructed to apply heat to affected area and gentle massage.  -we will f/u in 1 week       If labs or images are ordered we will contact you with the results within the next week.  If you have not heard from us after a week please call our office to inquire about the results.    Follow Up  Return in about 1 week (around 2/7/2024).    JUAN DIEGO Mukherjee  Answers submitted by the patient for this visit:  Other (Submitted on 1/27/2024)  Please describe your symptoms.: Follow up from ultrasound  Have you had these symptoms before?: Yes  How long have you been having these symptoms?: Greater than 2 weeks  Please list any medications you are currently taking for this condition.: None  Primary Reason for Visit (Submitted on 1/27/2024)  What is the primary reason for your visit?: Other

## 2024-03-05 RX ORDER — SODIUM PICOSULFATE, MAGNESIUM OXIDE, AND ANHYDROUS CITRIC ACID 12; 3.5; 1 G/175ML; G/175ML; MG/175ML
350 LIQUID ORAL ONCE
Qty: 350 ML | Refills: 0 | Status: SHIPPED | OUTPATIENT
Start: 2024-03-05 | End: 2024-03-05

## 2024-03-07 ENCOUNTER — PRIOR AUTHORIZATION (OUTPATIENT)
Dept: GASTROENTEROLOGY | Facility: CLINIC | Age: 59
End: 2024-03-07
Payer: MEDICAID

## 2024-03-07 NOTE — TELEPHONE ENCOUNTER
Key: [id number] - PA Case ID: [id number] - Rx #: [id number] help? Call us at [phone]  Status  Sent to [employer]  Drug  Clenpiq 10-3.5-12 MG-GM-GM/175ML solution  Form  [employer] [place] Medicaid ePA Form 2017 NCPDP

## 2025-02-28 ENCOUNTER — OFFICE VISIT (OUTPATIENT)
Dept: INTERNAL MEDICINE | Facility: CLINIC | Age: 60
End: 2025-02-28
Payer: MEDICAID

## 2025-02-28 VITALS
BODY MASS INDEX: 24.68 KG/M2 | TEMPERATURE: 99.1 F | HEIGHT: 66 IN | WEIGHT: 153.6 LBS | HEART RATE: 76 BPM | SYSTOLIC BLOOD PRESSURE: 126 MMHG | OXYGEN SATURATION: 100 % | DIASTOLIC BLOOD PRESSURE: 52 MMHG

## 2025-02-28 DIAGNOSIS — K52.9 CHRONIC DIARRHEA: ICD-10-CM

## 2025-02-28 DIAGNOSIS — M79.671 BILATERAL FOOT PAIN: ICD-10-CM

## 2025-02-28 DIAGNOSIS — Z12.4 ENCOUNTER FOR PAPANICOLAOU SMEAR OF CERVIX: ICD-10-CM

## 2025-02-28 DIAGNOSIS — F41.8 MIXED ANXIETY AND DEPRESSIVE DISORDER: ICD-10-CM

## 2025-02-28 DIAGNOSIS — Z12.31 ENCOUNTER FOR SCREENING MAMMOGRAM FOR MALIGNANT NEOPLASM OF BREAST: ICD-10-CM

## 2025-02-28 DIAGNOSIS — R07.2 PRECORDIAL PAIN: ICD-10-CM

## 2025-02-28 DIAGNOSIS — Z00.00 ANNUAL PHYSICAL EXAM: Primary | ICD-10-CM

## 2025-02-28 DIAGNOSIS — M79.672 BILATERAL FOOT PAIN: ICD-10-CM

## 2025-02-28 DIAGNOSIS — Z12.11 SCREEN FOR COLON CANCER: ICD-10-CM

## 2025-02-28 RX ORDER — DICYCLOMINE HCL 20 MG
TABLET ORAL
Qty: 60 TABLET | Refills: 2 | Status: SHIPPED | OUTPATIENT
Start: 2025-02-28

## 2025-02-28 RX ORDER — HYDROXYZINE PAMOATE 50 MG/1
CAPSULE ORAL EVERY 6 HOURS SCHEDULED
COMMUNITY

## 2025-02-28 RX ORDER — ONDANSETRON 4 MG/1
4 TABLET, FILM COATED ORAL EVERY 8 HOURS PRN
Qty: 15 TABLET | Refills: 0 | Status: SHIPPED | OUTPATIENT
Start: 2025-02-28

## 2025-02-28 NOTE — PROGRESS NOTES
[facility]  [address]  [place] [address]  Dept: [phone]  Dept Fax: [phone]  Loc: [phone]  Loc Fax: [phone]    Nathalia Rao  1965    Follow Up Office Visit Note    History of Present Illness:  Patient is a 59-year-old female in today for annual physical exam.  Also here to establish care for chronic diarrhea and nausea, bilateral foot pain, mixed anxiety and depression, and chest pain.  Patient struggling with chronic diarrhea.  Been going on for years.  Patient also has chronic nausea.    Patient has been having chest pain that she attributes to worsening anxiety.  Chest pain comes and goes.  Denies any aggravation or alleviation of symptoms with rest or activity.  Denies any symptoms currently.  Denies any other associated cardiac symptoms.    Patient on Wellbutrin and hydroxyzine for anxiety and depression.  Not working as well as it used to.  Would like referral to psychiatry.    Patient continues to struggle with bilateral foot pain.  Agreeable to seeing podiatry.    Diarrhea   Associated symptoms include arthralgias and myalgias. Pertinent negatives include no chills, coughing, fever, headaches or vomiting.   GI Problem  The primary symptoms include nausea, diarrhea, myalgias and arthralgias. Primary symptoms do not include fever, fatigue, vomiting, dysuria or rash.   The illness does not include chills.       The following portions of the patient's history were reviewed and updated as appropriate: allergies, current medications, past family history, past medical history, past social history, past surgical history, and problem list.    Medications:    Current Outpatient Medications:   •  buPROPion XL (Wellbutrin XL) 300 MG 24 hr tablet, Take 1 tablet by mouth Daily., Disp: 30 tablet, Rfl: 5  •  hydrOXYzine pamoate (Vistaril) 50 MG capsule, Every 6 (Six) Hours., Disp: , Rfl:   •  dicyclomine (BENTYL) 20 MG tablet, Take  1/2-1 tablet by mouth 4 times daily (every 4-6 hours) as needed for diarrhea., Disp: 60 tablet, Rfl: 2  •  hydrOXYzine (ATARAX) 25 MG tablet, Take 1 tablet by mouth Every 6 (Six) Hours As Needed for Anxiety. (Patient not taking: Reported on 2/28/2025), Disp: 30 tablet, Rfl: 5  •  nicotine (NICODERM CQ) 14 MG/24HR patch, Place 1 patch on the skin as directed by provider Daily. (Patient not taking: Reported on 2/28/2025), Disp: 28 each, Rfl: 3  •  ondansetron (Zofran) 4 MG tablet, Take 1 tablet by mouth Every 8 (Eight) Hours As Needed for Nausea or Vomiting., Disp: 15 tablet, Rfl: 0    Subjective  No Known Allergies     Past Medical History:   Diagnosis Date   • ADHD (attention deficit hyperactivity disorder) 20 years ago   • Anxiety    • Arthritis About 10 years ago    Getting worse   • Bulging lumbar disc    • Bulging of thoracic intervertebral disc    • Depression    • Fibromyalgia    • Fibromyalgia, primary About 6 years ago   • H/O pulmonary function tests 05/09/2019    no obstruction, moderate restriction   • Hepatitis C 2001    peg interferon, ribaviron   • Hyperlipidemia 6 years ago   • Hypertension    • Injury of back    • Irritable bowel syndrome 30 years ago   • Low back pain 2003    Progressively worse   • Murmur, cardiac    • Neuropathy    • PTSD (post-traumatic stress disorder)    • Raynaud's disease    • Scoliosis    • Shingles    • Visual impairment 1 year ago       Past Surgical History:   Procedure Laterality Date   • BACK SURGERY     • CHOLECYSTECTOMY  2001   • COLONOSCOPY  2013    patient states twisted colon   • SPINAL FUSION  2006   • TOTAL ABDOMINAL HYSTERECTOMY WITH SALPINGO OOPHORECTOMY  11/2014    ovarian cysts       Family History   Problem Relation Age of Onset   • Uterine cancer Mother    • Hypertension Mother    • Hyperlipidemia Mother    • Anxiety disorder Mother    • Cancer Mother    • Depression Mother    • Heart disease Mother    • Mental illness Mother    • Stomach cancer Father    •  Colon cancer Father    • Inflammatory bowel disease Father    • Alcohol abuse Father    • Cancer Father    • Mental illness Father    • Diabetes Maternal Grandmother    • Hypertension Maternal Grandmother    • Hyperlipidemia Maternal Grandmother    • Stroke Maternal Grandmother    • Drug abuse Maternal Grandmother    • Mental illness Maternal Grandmother    • Heart attack Maternal Grandfather    • Hypertension Maternal Grandfather         Social History     Socioeconomic History   • Marital status:    Tobacco Use   • Smoking status: Every Day     Current packs/day: 0.00     Average packs/day: 0.5 packs/day for 5.0 years (2.5 ttl pk-yrs)     Types: Cigarettes     Start date: 2013     Last attempt to quit: 2018     Years since quittin.1     Passive exposure: Current   • Smokeless tobacco: Never   Vaping Use   • Vaping status: Former   • Passive vaping exposure: Yes   Substance and Sexual Activity   • Alcohol use: No   • Drug use: Not Currently     Types: Cocaine(coke)     Comment: Alcohol   • Sexual activity: Yes     Partners: Female     Birth control/protection: None, Hysterectomy       Review of Systems   Constitutional:  Negative for activity change, chills, fatigue, fever and unexpected weight change.   HENT:  Negative for congestion, ear pain, postnasal drip, sinus pressure and sore throat.    Eyes:  Negative for pain, discharge and redness.   Respiratory:  Negative for cough, shortness of breath and wheezing.    Cardiovascular:  Negative for chest pain, palpitations and leg swelling.   Gastrointestinal:  Positive for diarrhea and nausea. Negative for vomiting.   Endocrine: Negative for cold intolerance and heat intolerance.   Genitourinary:  Negative for decreased urine volume and dysuria.   Musculoskeletal:  Positive for arthralgias and myalgias.   Skin:  Negative for rash and wound.   Neurological:  Negative for dizziness, light-headedness and headaches.   Hematological:  Does not  "bruise/bleed easily.   Psychiatric/Behavioral:  Positive for dysphoric mood. Negative for confusion, self-injury, sleep disturbance and suicidal ideas. The patient is nervous/anxious.          Objective  Vitals:    02/28/25 1252   BP: 126/52   BP Location: Left arm   Patient Position: Sitting   Cuff Size: Large Adult   Pulse: 76   Temp: 99.1 °F (37.3 °C)   TempSrc: Temporal   SpO2: 100%   Weight: 69.7 kg (153 lb 9.6 oz)   Height: 166.4 cm (65.51\")     Body mass index is 25.16 kg/m².     Physical Exam  Physical Exam  Vitals and nursing note reviewed.   Constitutional:       General: She is not in acute distress.     Appearance: She is not ill-appearing.   HENT:      Head: Normocephalic.      Right Ear: Tympanic membrane, ear canal and external ear normal. There is no impacted cerumen.      Left Ear: Tympanic membrane, ear canal and external ear normal. There is no impacted cerumen.      Nose: No congestion or rhinorrhea.      Mouth/Throat:      Mouth: Mucous membranes are moist.      Pharynx: Oropharynx is clear. No oropharyngeal exudate or posterior oropharyngeal erythema.   Eyes:      General:         Right eye: No discharge.         Left eye: No discharge.      Extraocular Movements: Extraocular movements intact.      Conjunctiva/sclera: Conjunctivae normal.      Pupils: Pupils are equal, round, and reactive to light.   Cardiovascular:      Rate and Rhythm: Normal rate and regular rhythm.      Heart sounds: Normal heart sounds. No murmur heard.     No friction rub. No gallop.   Pulmonary:      Effort: Pulmonary effort is normal. No respiratory distress.      Breath sounds: Normal breath sounds. No wheezing.   Abdominal:      General: Bowel sounds are normal. There is no distension.      Palpations: Abdomen is soft. There is no mass.      Tenderness: There is no abdominal tenderness.   Musculoskeletal:         General: No swelling. Normal range of motion.      Cervical back: Normal range of motion. No tenderness. "      Right lower leg: No edema.      Left lower leg: No edema.   Lymphadenopathy:      Cervical: No cervical adenopathy.   Skin:     Findings: No bruising, erythema or rash.   Neurological:      Mental Status: She is oriented to person, place, and time.      Gait: Gait normal.   Psychiatric:         Mood and Affect: Mood normal.         Behavior: Behavior normal.         Thought Content: Thought content normal.         Judgment: Judgment normal.       Diagnostic Data    ECG 12 Lead    Date/Time: 2/28/2025 1:56 PM  Performed by: Celso Rey PA-C    Authorized by: Celso Rey PA-C  Comparison: compared with previous ECG   Similar to previous ECG  Rhythm: sinus rhythm  Rate: normal  QRS axis: normal  Other findings: left atrial abnormality    Clinical impression: non-specific ECG        Assessment  Diagnoses and all orders for this visit:    1. Annual physical exam (Primary)  -     Vitamin B12 & Folate  -     Vitamin D,25-Hydroxy; Future  -     Lipid Panel  -     Hemoglobin A1c; Future  -     TSH Rfx On Abnormal To Free T4  -     Comprehensive Metabolic Panel  -     CBC (No Diff)    2. Precordial pain  -     Ambulatory Referral Chest Pain Clinic    3. Chronic diarrhea  -     Ambulatory Referral For Screening Colonoscopy    4. Encounter for Papanicolaou smear of cervix  -     Ambulatory Referral to Obstetrics / Gynecology    5. Encounter for screening mammogram for malignant neoplasm of breast  -     Mammo screening digital tomosynthesis bilateral w CAD; Future    6. Screen for colon cancer  -     Ambulatory Referral For Screening Colonoscopy    7. Bilateral foot pain  -     Ambulatory Referral to Podiatry    8. Mixed anxiety and depressive disorder  -     Ambulatory Referral to Psychiatry    Other orders  -     dicyclomine (BENTYL) 20 MG tablet; Take 1/2-1 tablet by mouth 4 times daily (every 4-6 hours) as needed for diarrhea.  Dispense: 60 tablet; Refill: 2  -     ondansetron (Zofran) 4 MG  tablet; Take 1 tablet by mouth Every 8 (Eight) Hours As Needed for Nausea or Vomiting.  Dispense: 15 tablet; Refill: 0        Plan    1. Annual physical exam (Primary)- ordered fasting labs and follow-up with these.  Advised on nutrition and follow-up with this.    2. Precordial pain- asymptomatic today.  EKG in office reveals sinus rhythm with possible left atrial enlargement negative for any acute ST changes.  Advised for return of symptoms or for any additional cardiac symptoms to call 911 immediately. Patient verbalized understanding of all instructions given and complied.  Referred to cardiology.    3. Chronic diarrhea- worse, trial of Bentyl and referred to GI.    4. Encounter for Papanicolaou smear of cervix- Ambulatory Referral to Obstetrics / Gynecology    5. Encounter for screening mammogram for malignant neoplasm of breast- ordered mammogram.    6. Screen for colon cancer- Ambulatory Referral For Screening Colonoscopy    7. Bilateral foot pain- Ambulatory Referral to Podiatry    8. Mixed anxiety and depressive disorder- uncontrolled on Wellbutrin and hydroxyzine.  Referred to psychiatry.      Return in about 6 weeks (around 4/11/2025) for Recheck.    Celso Rey PA-C  02/28/2025

## 2025-03-03 ENCOUNTER — PATIENT ROUNDING (BHMG ONLY) (OUTPATIENT)
Dept: INTERNAL MEDICINE | Facility: CLINIC | Age: 60
End: 2025-03-03
Payer: MEDICAID

## 2025-03-03 NOTE — PROGRESS NOTES
A  my chart message has been sent to the patient for patient rounding with [facility].

## 2025-03-07 ENCOUNTER — OFFICE VISIT (OUTPATIENT)
Dept: CARDIOLOGY | Facility: HOSPITAL | Age: 60
End: 2025-03-07
Payer: MEDICAID

## 2025-03-07 VITALS
BODY MASS INDEX: 24.67 KG/M2 | HEIGHT: 66 IN | OXYGEN SATURATION: 98 % | WEIGHT: 153.5 LBS | HEART RATE: 85 BPM | RESPIRATION RATE: 18 BRPM | DIASTOLIC BLOOD PRESSURE: 67 MMHG | SYSTOLIC BLOOD PRESSURE: 153 MMHG

## 2025-03-07 DIAGNOSIS — R00.2 PALPITATIONS: ICD-10-CM

## 2025-03-07 DIAGNOSIS — R06.09 DYSPNEA ON EXERTION: ICD-10-CM

## 2025-03-07 DIAGNOSIS — I10 PRIMARY HYPERTENSION: ICD-10-CM

## 2025-03-07 DIAGNOSIS — R07.89 OTHER CHEST PAIN: Primary | ICD-10-CM

## 2025-03-07 DIAGNOSIS — E78.2 MIXED HYPERLIPIDEMIA: ICD-10-CM

## 2025-03-07 NOTE — PATIENT INSTRUCTIONS
Obtain upper arm blood pressure cuff  Start to check blood pressure once per day  Do so after sitting and resting for 10-15 minutes  Write down date, time, blood pressure, and pulse rate  Goal blood pressure is consistently less than 140/90

## 2025-03-07 NOTE — PROGRESS NOTES
Chief Complaint  Chest Pain    Subjective      History of Present Illness {  Problem List  Visit  Diagnosis   Encounters  Notes  Medications  Labs  Result Review Imaging  Media :23}     Nathalia Rao, 59 y.o. female with past medical history significant for ADHD, anxiety, arthritis, fibromyalgia, hepatitis C, hyperlipidemia, hypertension, low back pain, Raynaud's, scoliosis, and shingles, who presents to [facility] for Chest Pain  Patient presented to primary care on 2/28/2025 for an annual physical exam.  Patient presented to establish care also for chronic diarrhea, nausea, bilateral foot pain, anxiety, depression, and chest discomfort.  Patient mentioned that she had been having chest pain which she felt to be secondary to anxiety.  Her chest pain was described as coming and going without exacerbating or alleviating factors.  Twelve-lead EKG showed normal sinus rhythm, rate 66, no acute ST segment changes.  Patient was recommended to undergo multiple laboratory evaluations including lipid panel which have not been completed at time of today's evaluation.  Patient was ultimately referred to heart and valve for further evaluation of chest discomfort.    At time of today's evaluation, patient states she has been having chest discomfort occurring every other day.  Her chest discomfort is lasting minutes in duration.  Pain is located on the left and right sides of chest without additional radiation.  She describes her chest discomfort as a pressure which is improved with rest.  Pain not consistently worsened with exertion.  Patient states she has noticed an ongoing and worsening dyspnea over the last couple of weeks.  She also has palpitations occur approximately a couple of times per week.  She endorses having dizziness especially with position changes, without syncope.  She also endorses a mild lower extremity edema.  Patient does not routinely check blood pressure or heart  "rate while at home.  Of note, patient states she has lost a significant amount of weight over the last 1.5 years.  Patient attributes this weight loss to poor dentition, and appetite changes.    Activity level: no routine exercise, severely deconditioned  Caffeine intake: multiple cans of Coca[employer]a daily  Water intake: minimal/none  Nicotine use: Current cigarette smoker 1/2 ppd  Family history: No early onset coronary artery disease in an immediate family member        Objective     Vital Signs:   Vitals:    03/07/25 0849 03/07/25 0850   BP: 150/67 153/67   BP Location: Left arm Left arm   Patient Position: Standing Sitting   Cuff Size: Adult Adult   Pulse: 88 85   Resp:  18   SpO2: 100% 98%   Weight:  69.6 kg (153 lb 8 oz)   Height:  166.4 cm (65.51\")     Body mass index is 25.15 kg/m².  Physical Exam  Vitals and nursing note reviewed.   Constitutional:       Appearance: Normal appearance.   HENT:      Head: Normocephalic.   Eyes:      Pupils: Pupils are equal, round, and reactive to light.   Cardiovascular:      Rate and Rhythm: Normal rate and regular rhythm.      Pulses: Normal pulses.      Heart sounds: Normal heart sounds. No murmur heard.  Pulmonary:      Effort: Pulmonary effort is normal.      Breath sounds: Normal breath sounds.   Abdominal:      General: Bowel sounds are normal.      Palpations: Abdomen is soft.   Musculoskeletal:         General: Normal range of motion.      Cervical back: Normal range of motion.      Right lower leg: No edema.      Left lower leg: No edema.   Skin:     General: Skin is warm and dry.      Capillary Refill: Capillary refill takes less than 2 seconds.   Neurological:      Mental Status: She is alert and oriented to person, place, and time.   Psychiatric:         Mood and Affect: Mood normal.         Thought Content: Thought content normal.                Data Reviewed:{ Labs  Result Review  Imaging  Med Tab  Media :23}     Lab Results   Component Value Date    WBC " 6.66 01/24/2024    HGB 14.1 01/24/2024    HCT 40.3 01/24/2024    MCV 97.8 (H) 01/24/2024     01/24/2024      Lab Results   Component Value Date    GLUCOSE 130 (H) 01/24/2024    BUN 5 (L) 01/24/2024    CREATININE 0.84 01/24/2024     01/24/2024    K 3.9 01/24/2024     01/24/2024    CALCIUM 9.8 01/24/2024    PROTEINTOT 7.5 09/27/2023    ALBUMIN 4.1 09/27/2023    ALT 35 (H) 09/27/2023    AST 56 (H) 09/27/2023    ALKPHOS 82 09/27/2023    BILITOT 0.7 09/27/2023    GLOB 3.4 09/27/2023    AGRATIO 1.2 09/27/2023    BCR 6.0 (L) 01/24/2024    ANIONGAP 12.4 01/24/2024    EGFR 79.7 01/24/2024      Lab Results   Component Value Date    CHOL 173 09/27/2023    TRIG 63 09/27/2023    HDL 43 09/27/2023     (H) 09/27/2023      Lab Results   Component Value Date    TSH 2.040 01/24/2024          Assessment & Plan   Assessment and Plan {CC Problem List  Visit Diagnosis  ROS  Review (Popup)  Health Maintenance  Quality  BestPractice  Medications  SmartSets  SnapShot Encounters  Media :23}     1. Other chest pain  -Most recent twelve-lead EKG completed at primary care reviewed and briefly discussed today with patient  -Patient does have symptoms of chest pain which are concerning.  She notices left-sided chest pain that is at times worse with activity, and consistently improved with rest  -Patient is aware she has multiple risk factors for coronary artery disease including hypertension, hyperlipidemia, and ongoing tobacco use  -We will plan to rule out ischemic causes of patient's chest discomfort in the form of a nuclear medicine stress test  -Nuclear medicine stress test warranted in patient with multiple risk factors for coronary artery disease, who would be unlikely to complete 5 METS on the treadmill.  Patient likely has component of chronic lung pathology such as COPD with ongoing tobacco use, fibromyalgia with chronic pain, possible neuropathy, and severe deconditioning.  -Will also obtain  echocardiogram to rule out structural or functional abnormalities which could be contributing to patient's symptoms  - Stress Test With Myocardial Perfusion (1 Day); Future  - Adult Transthoracic Echo Complete W/ Cont if Necessary Per Protocol; Future    2. Dyspnea on exertion  -While dyspnea could be secondary to deconditioning, and ongoing tobacco use, in combination with chest discomfort we will consider this as an anginal equivalent requiring further evaluation  -As mentioned above, patient to undergo nuclear medicine stress testing, and echocardiogram  -Recommend nicotine cessation.  Patient is interested in medication to assist with her cessation efforts.  Encouraged patient to discuss medications with primary care and next evaluation  - Stress Test With Myocardial Perfusion (1 Day); Future  - Adult Transthoracic Echo Complete W/ Cont if Necessary Per Protocol; Future    3. Primary hypertension  -Patient endorses a history of hypertension which previously required antihypertensive medication.  Her blood pressure was well-controlled during recent evaluation with primary care.  During today's evaluation, patient's blood pressure is noted to be elevated.  Patient suspects anxiety could be contributing to this finding  -Discussed with patient strong recommendation to obtain upper arm blood pressure cuff.  She was instructed to check her blood pressure once per day using upper arm blood pressure cuff after sitting and resting for 10 to 15 minutes.  Patient will write down date, time, blood pressure, and pulse rate.  She is aware her goal blood pressure is consistently less than 140/90  - Adult Transthoracic Echo Complete W/ Cont if Necessary Per Protocol; Future    4. Mixed hyperlipidemia  -Patient endorses a history of hyperlipidemia.  Her most recent lipid panel revealed mild elevations in LDL  -Discussed with patient importance of obtaining updating fasting lab work for further evaluation of risk factors for  coronary artery disease  -Patient denies previous use of statin medication    5. Palpitations  -Discussed at length with patient lifestyle factors which are likely contributing to episodes of palpitations.  Patient states she drinks only multiple sodas throughout the day and minimal if any water.  -Recommend patient increase hydration to 2 quarts of water daily, and limiting caffeine intake to 1-2 servings per day  -Also discussed with patient that nicotine could be contributing to patient's palpitations  -For now, we will attempt lifestyle adjustments in order to improve palpitations.  We will assess heart rate and rhythm on upcoming stress test  -If patient continues to have ongoing palpitations despite lifestyle modifications, we will likely proceed with 14-day cardiac monitor at that time      We will plan to follow-up closely with patient in approximately 3 weeks to discuss results of stress testing, echocardiogram, and ambulatory blood pressure monitoring.  Patient was encouraged strongly to reach out prior to that time if her symptoms change or worsen    Follow Up {Instructions Charge Capture  Follow-up Communications :23}     Return in about 3 weeks (around 3/28/2025) for Chest pain, Result review.    Patient was given instructions and counseling regarding her condition or for health maintenance advice. Please see specific information pulled into the AVS if appropriate. Patient was instructed to call the [facility] with any questions, concerns, or worsening symptoms.    Dictated Utilizing Dragon Dictation   Please note that portions of this note were completed with a voice recognition program. Part of this note may be an electronic transcription/translation of spoken language to printed text using the Dragon Dictation System.

## 2025-03-11 ENCOUNTER — HOSPITAL ENCOUNTER (OUTPATIENT)
Dept: CARDIOLOGY | Facility: HOSPITAL | Age: 60
Discharge: HOME | End: 2025-03-11
Admitting: NURSE PRACTITIONER
Payer: MEDICAID

## 2025-03-11 ENCOUNTER — APPOINTMENT (OUTPATIENT)
Dept: CARDIOLOGY | Facility: HOSPITAL | Age: 60
End: 2025-03-11
Payer: MEDICAID

## 2025-03-11 ENCOUNTER — HOSPITAL ENCOUNTER (OUTPATIENT)
Dept: CARDIOLOGY | Facility: HOSPITAL | Age: 60
End: 2025-03-11
Payer: MEDICAID

## 2025-03-11 VITALS
BODY MASS INDEX: 24.77 KG/M2 | DIASTOLIC BLOOD PRESSURE: 49 MMHG | SYSTOLIC BLOOD PRESSURE: 129 MMHG | WEIGHT: 154.1 LBS | HEIGHT: 66 IN

## 2025-03-11 DIAGNOSIS — I10 PRIMARY HYPERTENSION: ICD-10-CM

## 2025-03-11 DIAGNOSIS — R07.89 OTHER CHEST PAIN: ICD-10-CM

## 2025-03-11 DIAGNOSIS — R06.09 DYSPNEA ON EXERTION: ICD-10-CM

## 2025-03-11 LAB
ASCENDING AORTA: 2.5 CM
AV MEAN PRESS GRAD SYS DOP V1V2: 10.9 MMHG
AV VMAX SYS DOP: 223.3 CM/SEC
BH CV ECHO MEAS - AO MAX PG: 20.1 MMHG
BH CV ECHO MEAS - AO ROOT DIAM: 2.5 CM
BH CV ECHO MEAS - AO V2 VTI: 48.2 CM
BH CV ECHO MEAS - AVA(I,D): 1.92 CM2
BH CV ECHO MEAS - IVS/LVPW: 1 CM
BH CV ECHO MEAS - IVSD: 0.8 CM
BH CV ECHO MEAS - LA DIMENSION: 3.4 CM
BH CV ECHO MEAS - LAT PEAK E' VEL: 16.5 CM/SEC
BH CV ECHO MEAS - LV MAX PG: 8.2 MMHG
BH CV ECHO MEAS - LV MEAN PG: 4 MMHG
BH CV ECHO MEAS - LV V1 MAX: 142.9 CM/SEC
BH CV ECHO MEAS - LV V1 VTI: 32.7 CM
BH CV ECHO MEAS - LVIDD: 4.5 CM
BH CV ECHO MEAS - LVIDS: 2.5 CM
BH CV ECHO MEAS - LVOT AREA: 2.8 CM2
BH CV ECHO MEAS - LVOT DIAM: 1.9 CM
BH CV ECHO MEAS - LVPWD: 0.8 CM
BH CV ECHO MEAS - MED PEAK E' VEL: 14.3 CM/SEC
BH CV ECHO MEAS - MV A MAX VEL: 92 CM/SEC
BH CV ECHO MEAS - MV DEC SLOPE: 782.3 CM/SEC2
BH CV ECHO MEAS - MV E MAX VEL: 114.4 CM/SEC
BH CV ECHO MEAS - MV E/A: 1.24
BH CV ECHO MEAS - MV MAX PG: 6.2 MMHG
BH CV ECHO MEAS - MV MEAN PG: 3.3 MMHG
BH CV ECHO MEAS - MV P1/2T: 48 MSEC
BH CV ECHO MEAS - MV V2 VTI: 30.5 CM
BH CV ECHO MEAS - MVA(VTI): 3 CM2
BH CV ECHO MEAS - PA ACC TIME: 0.13 SEC
BH CV ECHO MEAS - PA V2 MAX: 153.4 CM/SEC
BH CV ECHO MEAS - RAP SYSTOLE: 3 MMHG
BH CV ECHO MEAS - RVSP: 22 MMHG
BH CV ECHO MEAS - SV(LVOT): 92.8 ML
BH CV ECHO MEAS - TAPSE (>1.6): 2.6 CM
BH CV ECHO MEAS - TR MAX PG: 18.8 MMHG
BH CV ECHO MEAS - TR MAX VEL: 216.6 CM/SEC
BH CV ECHO MEASUREMENTS AVERAGE E/E' RATIO: 7.43
BH CV VAS BP LEFT ARM: NORMAL MMHG
BH CV XLRA - RV BASE: 3.5 CM
BH CV XLRA - RV LENGTH: 6.76 CM
BH CV XLRA - RV MID: 3 CM
BH CV XLRA - TDI S': 13.9 CM/SEC
LEFT ATRIUM VOLUME INDEX: 26.5 ML/M2
LV EF 2D ECHO EST: 65 %
LV EF BIPLANE MOD: 71.8 %

## 2025-03-11 PROCEDURE — 93306 TTE W/DOPPLER COMPLETE: CPT

## 2025-03-13 DIAGNOSIS — I10 PRIMARY HYPERTENSION: ICD-10-CM

## 2025-03-13 DIAGNOSIS — R06.09 DYSPNEA ON EXERTION: ICD-10-CM

## 2025-03-13 DIAGNOSIS — R07.89 OTHER CHEST PAIN: Primary | ICD-10-CM

## 2025-03-27 ENCOUNTER — TELEPHONE (OUTPATIENT)
Dept: CARDIOLOGY | Facility: HOSPITAL | Age: 60
End: 2025-03-27
Payer: MEDICAID

## 2025-03-27 NOTE — TELEPHONE ENCOUNTER
----- Message from Tanvi Yoandy sent at 3/27/2025  8:56 AM EDT -----  Regarding: follow up visit  NO rush at all, but this patient was supposed to have a regular treadmill stress (nuclear was denied) which seems to be cancelled. It is reasonable to reschedule her stress test, then cancel her visit with me on 4/2 until after testing has been completed.

## 2025-04-02 ENCOUNTER — OFFICE VISIT (OUTPATIENT)
Age: 60
End: 2025-04-02
Payer: MEDICAID

## 2025-04-02 VITALS
HEART RATE: 74 BPM | WEIGHT: 149.5 LBS | BODY MASS INDEX: 24.03 KG/M2 | OXYGEN SATURATION: 99 % | HEIGHT: 66 IN | SYSTOLIC BLOOD PRESSURE: 120 MMHG | DIASTOLIC BLOOD PRESSURE: 74 MMHG

## 2025-04-02 DIAGNOSIS — Z13.39 ATTENTION DEFICIT HYPERACTIVITY DISORDER (ADHD) EVALUATION: ICD-10-CM

## 2025-04-02 DIAGNOSIS — F19.11 HISTORY OF SUBSTANCE ABUSE: ICD-10-CM

## 2025-04-02 DIAGNOSIS — F33.2 SEVERE EPISODE OF RECURRENT MAJOR DEPRESSIVE DISORDER, WITHOUT PSYCHOTIC FEATURES: Primary | ICD-10-CM

## 2025-04-02 DIAGNOSIS — F43.10 POST TRAUMATIC STRESS DISORDER (PTSD): ICD-10-CM

## 2025-04-02 DIAGNOSIS — Z51.81 THERAPEUTIC DRUG MONITORING: ICD-10-CM

## 2025-04-02 DIAGNOSIS — F10.11 HISTORY OF ALCOHOL ABUSE: ICD-10-CM

## 2025-04-02 DIAGNOSIS — F41.1 GENERALIZED ANXIETY DISORDER: ICD-10-CM

## 2025-04-02 DIAGNOSIS — Z91.51 HISTORY OF SUICIDE ATTEMPT: ICD-10-CM

## 2025-04-02 RX ORDER — ARIPIPRAZOLE 2 MG/1
2 TABLET ORAL DAILY
Qty: 30 TABLET | Refills: 2 | Status: SHIPPED | OUTPATIENT
Start: 2025-04-02
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
## 2025-04-07 ENCOUNTER — TELEPHONE (OUTPATIENT)
Dept: INTERNAL MEDICINE | Facility: CLINIC | Age: 60
End: 2025-04-07
Payer: MEDICAID

## 2025-04-07 DIAGNOSIS — F33.2 SEVERE EPISODE OF RECURRENT MAJOR DEPRESSIVE DISORDER, WITHOUT PSYCHOTIC FEATURES: ICD-10-CM

## 2025-04-07 LAB
1OH-MIDAZOLAM UR QL SCN: NOT DETECTED NG/MG CREAT
6MAM UR QL SCN: NEGATIVE NG/ML
7AMINOCLONAZEPAM/CREAT UR: NOT DETECTED NG/MG CREAT
A-OH ALPRAZ/CREAT UR: NOT DETECTED NG/MG CREAT
A-OH-TRIAZOLAM/CREAT UR CFM: NOT DETECTED NG/MG CREAT
ACP UR QL CFM: NOT DETECTED
ALPRAZ/CREAT UR CFM: NOT DETECTED NG/MG CREAT
AMPHETAMINES UR QL SCN: NEGATIVE NG/ML
APAP UR QL SCN: NEGATIVE UG/ML
BARBITURATES UR QL SCN: NEGATIVE NG/ML
BENZODIAZ SCN METH UR: NEGATIVE
BUPRENORPHINE UR QL SCN: NEGATIVE
BUPRENORPHINE/CREAT UR: NOT DETECTED NG/MG CREAT
CANNABINOIDS UR QL CFM: NORMAL
CANNABINOIDS UR QL SCN: NORMAL NG/ML
CARBOXYTHC UR CFM-MCNC: 183 NG/MG CREAT
CARISOPRODOL UR QL: NEGATIVE NG/ML
CLONAZEPAM/CREAT UR CFM: NOT DETECTED NG/MG CREAT
COCAINE+BZE UR QL SCN: NEGATIVE NG/ML
CREAT UR-MCNC: 70 MG/DL
D-METHORPHAN UR-MCNC: NOT DETECTED NG/ML
D-METHORPHAN+LEVORPHANOL UR QL: NOT DETECTED
DESALKYLFLURAZ/CREAT UR: NOT DETECTED NG/MG CREAT
DIAZEPAM/CREAT UR: NOT DETECTED NG/MG CREAT
ETHANOL UR QL SCN: NEGATIVE G/DL
ETHANOL UR QL SCN: NEGATIVE NG/ML
FENTANYL CTO UR SCN-MCNC: NEGATIVE NG/ML
FENTANYL/CREAT UR: NOT DETECTED NG/MG CREAT
FLUNITRAZEPAM UR QL SCN: NOT DETECTED NG/MG CREAT
GABAPENTIN UR-MCNC: NEGATIVE UG/ML
HALLUCINOGENS UR: NEGATIVE
HYPNOTICS UR QL SCN: NEGATIVE
KETAMINE UR QL: NOT DETECTED
LORAZEPAM/CREAT UR: NOT DETECTED NG/MG CREAT
MEPERIDINE UR QL SCN: NEGATIVE NG/ML
METHADONE UR QL SCN: NEGATIVE NG/ML
METHADONE+METAB UR QL SCN: NEGATIVE NG/ML
MIDAZOLAM/CREAT UR CFM: NOT DETECTED NG/MG CREAT
MISCELLANEOUS, UR: NEGATIVE
NORBUPRENORPHINE/CREAT UR: NOT DETECTED NG/MG CREAT
NORDIAZEPAM/CREAT UR: NOT DETECTED NG/MG CREAT
NORFENTANYL/CREAT UR: NOT DETECTED NG/MG CREAT
NORFLUNITRAZEPAM UR-MCNC: NOT DETECTED NG/MG CREAT
NORKETAMINE UR-MCNC: NOT DETECTED UG/ML
OPIATES UR SCN-MCNC: NEGATIVE NG/ML
OXAZEPAM/CREAT UR: NOT DETECTED NG/MG CREAT
OXYCODONE CTO UR SCN-MCNC: NEGATIVE NG/ML
PCP UR QL SCN: NEGATIVE NG/ML
PRESCRIBED MEDICATIONS: NORMAL
PROPOXYPH UR QL SCN: NEGATIVE NG/ML
TAPENTADOL CTO UR SCN-MCNC: NEGATIVE NG/ML
TEMAZEPAM/CREAT UR: NOT DETECTED NG/MG CREAT
TRAMADOL UR QL SCN: NEGATIVE NG/ML
ZALEPLON UR-MCNC: NOT DETECTED NG/ML
ZOLPIDEM PHENYL-4-CARB UR QL SCN: NOT DETECTED
ZOLPIDEM UR QL SCN: NOT DETECTED
ZOPICLONE-N-OXIDE UR-MCNC: NOT DETECTED NG/ML

## 2025-04-07 RX ORDER — ARIPIPRAZOLE 2 MG/1
2 TABLET ORAL DAILY
Qty: 30 TABLET | Refills: 2 | Status: CANCELLED | OUTPATIENT
Start: 2025-04-07

## 2025-04-07 NOTE — TELEPHONE ENCOUNTER
Messaged patient to let her know we were unaware of her needing a PA for medication. Initiating patient's PA for medication. Initiated via CMM (Key: [id number]).     Will contact patient when we have a determination.

## 2025-04-07 NOTE — TELEPHONE ENCOUNTER
Advised patient via my chart that her insurance has approved the medication ARIPRAZOLE 2 mg tablet . This authorization is good from 04/07/25 to 04/07/26. A copy of this letter will be scanned into the chart .

## 2025-04-07 NOTE — TELEPHONE ENCOUNTER
Rx Refill Note  Requested Prescriptions      No prescriptions requested or ordered in this encounter      Last office visit with prescribing clinician: 4/2/2025   Last telemedicine visit with prescribing clinician: Visit date not found   Next office visit with prescribing clinician: 5/14/2025                         Would you like a call back once the refill request has been completed: [] Yes [x] No    If the office needs to give you a call back, can they leave a voicemail: [x] Yes [] No    Chelsy Bishop MA  04/07/25, 14:34 EDT

## 2025-04-08 ENCOUNTER — TELEPHONE (OUTPATIENT)
Dept: INTERNAL MEDICINE | Facility: CLINIC | Age: 60
End: 2025-04-08
Payer: MEDICAID

## 2025-04-08 NOTE — TELEPHONE ENCOUNTER
Advised patient via my chart that Just letting you know that as per the insurance company a prior authorization is not required for the ARIPIPRAZOLE 2 mg tablet because the medication currently has a prior authorization already which is good through 04/06/26.  A copy of this letter will be scanned into the chart .

## 2025-04-11 ENCOUNTER — LAB (OUTPATIENT)
Dept: INTERNAL MEDICINE | Facility: CLINIC | Age: 60
End: 2025-04-11
Payer: MEDICAID

## 2025-04-11 ENCOUNTER — OFFICE VISIT (OUTPATIENT)
Dept: INTERNAL MEDICINE | Facility: CLINIC | Age: 60
End: 2025-04-11
Payer: MEDICAID

## 2025-04-11 ENCOUNTER — TELEPHONE (OUTPATIENT)
Dept: CARDIOLOGY | Facility: HOSPITAL | Age: 60
End: 2025-04-11
Payer: MEDICAID

## 2025-04-11 VITALS
HEART RATE: 84 BPM | WEIGHT: 144.4 LBS | TEMPERATURE: 98.4 F | OXYGEN SATURATION: 99 % | DIASTOLIC BLOOD PRESSURE: 54 MMHG | SYSTOLIC BLOOD PRESSURE: 124 MMHG | BODY MASS INDEX: 23.21 KG/M2 | HEIGHT: 66 IN

## 2025-04-11 DIAGNOSIS — Z12.11 SCREEN FOR COLON CANCER: ICD-10-CM

## 2025-04-11 DIAGNOSIS — K52.9 CHRONIC DIARRHEA: ICD-10-CM

## 2025-04-11 DIAGNOSIS — F41.8 MIXED ANXIETY AND DEPRESSIVE DISORDER: ICD-10-CM

## 2025-04-11 DIAGNOSIS — Z00.00 ANNUAL PHYSICAL EXAM: ICD-10-CM

## 2025-04-11 DIAGNOSIS — Z12.4 ENCOUNTER FOR PAPANICOLAOU SMEAR OF CERVIX: ICD-10-CM

## 2025-04-11 DIAGNOSIS — R07.2 PRECORDIAL PAIN: Primary | ICD-10-CM

## 2025-04-11 DIAGNOSIS — Z12.31 ENCOUNTER FOR SCREENING MAMMOGRAM FOR MALIGNANT NEOPLASM OF BREAST: ICD-10-CM

## 2025-04-11 LAB
ALBUMIN SERPL-MCNC: 3.9 G/DL (ref 3.5–5.2)
ALBUMIN/GLOB SERPL: 1.1 G/DL
ALP SERPL-CCNC: 122 U/L (ref 39–117)
ALT SERPL W P-5'-P-CCNC: 28 U/L (ref 1–33)
ANION GAP SERPL CALCULATED.3IONS-SCNC: 14 MMOL/L (ref 5–15)
AST SERPL-CCNC: 84 U/L (ref 1–32)
BILIRUB SERPL-MCNC: 2.2 MG/DL (ref 0–1.2)
BUN SERPL-MCNC: 4 MG/DL (ref 8–23)
BUN/CREAT SERPL: 4.7 (ref 7–25)
CALCIUM SPEC-SCNC: 9.4 MG/DL (ref 8.6–10.5)
CHLORIDE SERPL-SCNC: 101 MMOL/L (ref 98–107)
CHOLEST SERPL-MCNC: 170 MG/DL (ref 0–200)
CO2 SERPL-SCNC: 25 MMOL/L (ref 22–29)
CREAT SERPL-MCNC: 0.86 MG/DL (ref 0.57–1)
DEPRECATED RDW RBC AUTO: 46.7 FL (ref 37–54)
EGFRCR SERPLBLD CKD-EPI 2021: 77 ML/MIN/1.73
ERYTHROCYTE [DISTWIDTH] IN BLOOD BY AUTOMATED COUNT: 13 % (ref 12.3–15.4)
GLOBULIN UR ELPH-MCNC: 3.5 GM/DL
GLUCOSE SERPL-MCNC: 117 MG/DL (ref 65–99)
HBA1C MFR BLD: 4.7 % (ref 4.8–5.6)
HCT VFR BLD AUTO: 40.4 % (ref 34–46.6)
HDLC SERPL-MCNC: 43 MG/DL (ref 40–60)
HGB BLD-MCNC: 14.4 G/DL (ref 12–15.9)
LDLC SERPL CALC-MCNC: 112 MG/DL (ref 0–100)
LDLC/HDLC SERPL: 2.58 {RATIO}
MCH RBC QN AUTO: 35.3 PG (ref 26.6–33)
MCHC RBC AUTO-ENTMCNC: 35.6 G/DL (ref 31.5–35.7)
MCV RBC AUTO: 99 FL (ref 79–97)
PLATELET # BLD AUTO: 136 10*3/MM3 (ref 140–450)
PMV BLD AUTO: 10.2 FL (ref 6–12)
POTASSIUM SERPL-SCNC: 3.4 MMOL/L (ref 3.5–5.2)
PROT SERPL-MCNC: 7.4 G/DL (ref 6–8.5)
RBC # BLD AUTO: 4.08 10*6/MM3 (ref 3.77–5.28)
SODIUM SERPL-SCNC: 140 MMOL/L (ref 136–145)
TRIGL SERPL-MCNC: 80 MG/DL (ref 0–150)
TSH SERPL DL<=0.05 MIU/L-ACNC: 2.07 UIU/ML (ref 0.27–4.2)
VLDLC SERPL-MCNC: 15 MG/DL (ref 5–40)
WBC NRBC COR # BLD AUTO: 6.93 10*3/MM3 (ref 3.4–10.8)

## 2025-04-11 PROCEDURE — 82746 ASSAY OF FOLIC ACID SERUM: CPT | Performed by: PHYSICIAN ASSISTANT

## 2025-04-11 PROCEDURE — 82607 VITAMIN B-12: CPT | Performed by: PHYSICIAN ASSISTANT

## 2025-04-11 PROCEDURE — 82306 VITAMIN D 25 HYDROXY: CPT | Performed by: PHYSICIAN ASSISTANT

## 2025-04-11 PROCEDURE — 80061 LIPID PANEL: CPT | Performed by: PHYSICIAN ASSISTANT

## 2025-04-11 PROCEDURE — 84443 ASSAY THYROID STIM HORMONE: CPT | Performed by: PHYSICIAN ASSISTANT

## 2025-04-11 PROCEDURE — 83036 HEMOGLOBIN GLYCOSYLATED A1C: CPT | Performed by: PHYSICIAN ASSISTANT

## 2025-04-11 PROCEDURE — 85027 COMPLETE CBC AUTOMATED: CPT | Performed by: PHYSICIAN ASSISTANT

## 2025-04-11 PROCEDURE — 36415 COLL VENOUS BLD VENIPUNCTURE: CPT | Performed by: PHYSICIAN ASSISTANT

## 2025-04-11 PROCEDURE — 80053 COMPREHEN METABOLIC PANEL: CPT | Performed by: PHYSICIAN ASSISTANT

## 2025-04-11 RX ORDER — ONDANSETRON 4 MG/1
4 TABLET, FILM COATED ORAL EVERY 8 HOURS PRN
Qty: 15 TABLET | Refills: 0 | Status: SHIPPED | OUTPATIENT
Start: 2025-04-11

## 2025-04-11 NOTE — PROGRESS NOTES
[facility]  [address]  [facility] [address]  Dept: [phone]  Dept Fax: [phone]  Loc: [phone]  Loc Fax: [phone]    Nathalia Rao  1965    Follow Up Office Visit Note    History of Present Illness:  Pt is a 58 y/o female in to follow up for chronic diarrhea and nausea, mixed anxiety and depression, and chest pain.  Patient struggling with chronic diarrhea.  Been going on for years.  Patient also has chronic nausea.     Patient has been having chest pain that she attributes to worsening anxiety.  Chest pain comes and goes.  Denies any aggravation or alleviation of symptoms with rest or activity.  Denies any symptoms currently.  Denies any other associated cardiac symptoms. Has seen cardiology, awaiting stress test.     Patient on Wellbutrin and hydroxyzine for anxiety and depression.  Not working as well as it used to.  Would like referral to psychiatry.      Nausea  Symptoms include abdominal pain, anorexia, myalgias, nausea and vomiting.    Pertinent negative symptoms include no chest pain, no chills, no congestion, no cough, no fatigue, no fever, no headaches, no rash, no sore throat and no dysuria.   Abdominal Pain  This is a chronic problem. The current episode started more than 1 month ago. The onset quality is undetermined. The problem occurs every several days. The most recent episode lasted 1 hours. The problem has been coming and going. The pain is located in the epigastric region, periumbilical region, left flank and right flank. The pain is at a severity of 5/10. The quality of the pain is cramping. The abdominal pain radiates to the periumbilical region. Associated symptoms include anorexia, arthralgias, diarrhea, flatus, frequency, myalgias, nausea, vomiting and weight loss. Pertinent negatives include no dysuria, fever or headaches. Nothing aggravates the pain. The pain is relieved by Belching, bowel movements  and passing flatus.       The following portions of the patient's history were reviewed and updated as appropriate: allergies, current medications, past family history, past medical history, past social history, past surgical history, and problem list.    Medications:    Current Outpatient Medications:     ARIPiprazole (Abilify) 2 MG tablet, Take 1 tablet by mouth Daily., Disp: 30 tablet, Rfl: 2    dicyclomine (BENTYL) 20 MG tablet, Take 1/2-1 tablet by mouth 4 times daily (every 4-6 hours) as needed for diarrhea., Disp: 60 tablet, Rfl: 2    ondansetron (Zofran) 4 MG tablet, Take 1 tablet by mouth Every 8 (Eight) Hours As Needed for Nausea or Vomiting., Disp: 15 tablet, Rfl: 0    nicotine (NICODERM CQ) 14 MG/24HR patch, Place 1 patch on the skin as directed by provider Daily. (Patient not taking: Reported on 4/11/2025), Disp: 28 each, Rfl: 3    Subjective  No Known Allergies     Past Medical History:   Diagnosis Date    Abnormal ECG 3/2025    ADHD (attention deficit hyperactivity disorder) 20 years ago    Alcoholism     Anxiety     Arthritis About 10 years ago    Getting worse    Bulging lumbar disc     Bulging of thoracic intervertebral disc     Chronic pain disorder 2006    Depression     Fibromyalgia     Fibromyalgia, primary About 6 years ago    H/O pulmonary function tests 05/09/2019    no obstruction, moderate restriction    Hepatitis C 2001    peg interferon, ribaviron    Hyperlipidemia 6 years ago    Hypertension     Injury of back     Irritable bowel syndrome 30 years ago    Low back pain 2003    Progressively worse    Murmur, cardiac     Neuropathy     PTSD (post-traumatic stress disorder)     Raynaud's disease     Scoliosis     Shingles     Substance abuse     Visual impairment 1 year ago       Past Surgical History:   Procedure Laterality Date    BACK SURGERY      CHOLECYSTECTOMY  2001    COLONOSCOPY  2013    patient states twisted colon    SPINAL FUSION  2006    TOTAL ABDOMINAL HYSTERECTOMY WITH  SALPINGO OOPHORECTOMY  2014    ovarian cysts       Family History   Problem Relation Age of Onset    Uterine cancer Mother     Hypertension Mother     Hyperlipidemia Mother     Anxiety disorder Mother     Cancer Mother     Depression Mother     Heart disease Mother     Mental illness Mother     Stomach cancer Father     Colon cancer Father     Inflammatory bowel disease Father     Alcohol abuse Father     Cancer Father     Mental illness Father     Diabetes Maternal Grandmother     Hypertension Maternal Grandmother     Hyperlipidemia Maternal Grandmother     Stroke Maternal Grandmother     Drug abuse Maternal Grandmother     Mental illness Maternal Grandmother     Heart attack Maternal Grandfather     Hypertension Maternal Grandfather         Social History     Socioeconomic History    Marital status:    Tobacco Use    Smoking status: Every Day     Current packs/day: 0.00     Average packs/day: 0.5 packs/day for 5.0 years (2.5 ttl pk-yrs)     Types: Cigarettes     Start date: 2013     Last attempt to quit: 2018     Years since quittin.2     Passive exposure: Current    Smokeless tobacco: Never   Vaping Use    Vaping status: Former    Passive vaping exposure: Yes   Substance and Sexual Activity    Alcohol use: Not Currently    Drug use: Not Currently     Types: Cocaine(coke)     Comment: Alcohol    Sexual activity: Yes     Partners: Female     Birth control/protection: Post-menopausal, None, Hysterectomy       Review of Systems   Constitutional:  Positive for weight loss. Negative for activity change, chills, fatigue, fever and unexpected weight change.   HENT:  Negative for congestion, ear pain, postnasal drip, sinus pressure and sore throat.    Eyes:  Negative for pain, discharge and redness.   Respiratory:  Negative for cough, shortness of breath and wheezing.    Cardiovascular:  Negative for chest pain, palpitations and leg swelling.   Gastrointestinal:  Positive for abdominal pain,  "anorexia, diarrhea, flatus, nausea and vomiting.   Endocrine: Negative for cold intolerance and heat intolerance.   Genitourinary:  Positive for frequency. Negative for decreased urine volume and dysuria.   Musculoskeletal:  Positive for arthralgias and myalgias.   Skin:  Negative for rash and wound.   Neurological:  Negative for dizziness, light-headedness and headaches.   Hematological:  Does not bruise/bleed easily.   Psychiatric/Behavioral:  Negative for confusion, dysphoric mood and sleep disturbance. The patient is not nervous/anxious.          Objective  Vitals:    04/11/25 0851   BP: 124/54   BP Location: Left arm   Patient Position: Sitting   Cuff Size: Large Adult   Pulse: 84   Temp: 98.4 °F (36.9 °C)   TempSrc: Temporal   SpO2: 99%   Weight: 65.5 kg (144 lb 6.4 oz)   Height: 166.4 cm (65.51\")     Body mass index is 23.66 kg/m².     Physical Exam  Physical Exam  Vitals and nursing note reviewed.   Constitutional:       General: She is not in acute distress.     Appearance: She is not ill-appearing.   HENT:      Head: Normocephalic.      Right Ear: Tympanic membrane, ear canal and external ear normal. There is no impacted cerumen.      Left Ear: Tympanic membrane, ear canal and external ear normal. There is no impacted cerumen.      Nose: No congestion or rhinorrhea.      Mouth/Throat:      Mouth: Mucous membranes are moist.      Pharynx: Oropharynx is clear. No oropharyngeal exudate or posterior oropharyngeal erythema.   Eyes:      General:         Right eye: No discharge.         Left eye: No discharge.      Extraocular Movements: Extraocular movements intact.      Conjunctiva/sclera: Conjunctivae normal.      Pupils: Pupils are equal, round, and reactive to light.   Cardiovascular:      Rate and Rhythm: Normal rate and regular rhythm.      Heart sounds: Normal heart sounds. No murmur heard.     No friction rub. No gallop.   Pulmonary:      Effort: Pulmonary effort is normal. No respiratory distress.      " Breath sounds: Normal breath sounds. No wheezing.   Abdominal:      General: Bowel sounds are normal. There is no distension.      Palpations: Abdomen is soft. There is no mass.      Tenderness: There is no abdominal tenderness.   Musculoskeletal:         General: No swelling. Normal range of motion.      Cervical back: Normal range of motion. No tenderness.      Right lower leg: No edema.      Left lower leg: No edema.   Lymphadenopathy:      Cervical: No cervical adenopathy.   Skin:     Findings: No bruising, erythema or rash.   Neurological:      Mental Status: She is oriented to person, place, and time.      Gait: Gait normal.   Psychiatric:         Mood and Affect: Mood normal.         Behavior: Behavior normal.         Thought Content: Thought content normal.         Judgment: Judgment normal.         Diagnostic Data  Procedures    Assessment  Diagnoses and all orders for this visit:    1. Precordial pain (Primary)    2. Chronic diarrhea    3. Encounter for Papanicolaou smear of cervix    4. Encounter for screening mammogram for malignant neoplasm of breast    5. Screen for colon cancer    6. Mixed anxiety and depressive disorder        Plan    1. Precordial pain- followng w/ cardiology. Awaiting stress test.    2. Chronic diarrhea- worse, bentyl helped some.     3. Encounter for Papanicolaou smear of cervix- Ambulatory Referral to Obstetrics / Gynecology     4. Encounter for screening mammogram for malignant neoplasm of breast- ordered mammogram.     5. Screen for colon cancer- Ambulatory Referral For Screening Colonoscopy     7. Mixed anxiety and depressive disorder- refilled hydroxyzine.      Return in about 3 months (around 7/11/2025) for Recheck.    Celso Rey PA-C  04/11/2025

## 2025-04-12 LAB
25(OH)D3 SERPL-MCNC: 17.9 NG/ML (ref 30–100)
FOLATE SERPL-MCNC: 3.7 NG/ML (ref 4.78–24.2)
VIT B12 BLD-MCNC: 955 PG/ML (ref 211–946)

## 2025-04-14 DIAGNOSIS — R79.89 ELEVATED LFTS: Primary | ICD-10-CM

## 2025-04-14 RX ORDER — CHOLECALCIFEROL (VITAMIN D3) 50 MCG
2000 TABLET ORAL DAILY
Qty: 90 TABLET | Refills: 1 | Status: SHIPPED | OUTPATIENT
Start: 2025-04-14

## 2025-04-14 RX ORDER — HYDROXYZINE HYDROCHLORIDE 25 MG/1
25 TABLET, FILM COATED ORAL EVERY 6 HOURS PRN
Qty: 60 TABLET | Refills: 2 | Status: SHIPPED | OUTPATIENT
Start: 2025-04-14

## 2025-04-14 RX ORDER — ATORVASTATIN CALCIUM 10 MG/1
10 TABLET, FILM COATED ORAL NIGHTLY
Qty: 90 TABLET | Refills: 1 | Status: SHIPPED | OUTPATIENT
Start: 2025-04-14

## 2025-05-13 ENCOUNTER — LAB (OUTPATIENT)
Dept: INTERNAL MEDICINE | Age: 60
End: 2025-05-13
Payer: MEDICAID

## 2025-05-13 ENCOUNTER — OFFICE VISIT (OUTPATIENT)
Dept: INTERNAL MEDICINE | Age: 60
End: 2025-05-13
Payer: MEDICAID

## 2025-05-13 VITALS
RESPIRATION RATE: 18 BRPM | HEIGHT: 66 IN | HEART RATE: 89 BPM | WEIGHT: 145.4 LBS | OXYGEN SATURATION: 98 % | DIASTOLIC BLOOD PRESSURE: 68 MMHG | TEMPERATURE: 97.1 F | SYSTOLIC BLOOD PRESSURE: 130 MMHG | BODY MASS INDEX: 23.37 KG/M2

## 2025-05-13 DIAGNOSIS — K74.60 HEPATIC CIRRHOSIS, UNSPECIFIED HEPATIC CIRRHOSIS TYPE, UNSPECIFIED WHETHER ASCITES PRESENT: ICD-10-CM

## 2025-05-13 DIAGNOSIS — R11.0 CHRONIC NAUSEA: ICD-10-CM

## 2025-05-13 DIAGNOSIS — K52.9 COLITIS: ICD-10-CM

## 2025-05-13 DIAGNOSIS — K52.9 COLITIS: Primary | ICD-10-CM

## 2025-05-13 DIAGNOSIS — F41.8 MIXED ANXIETY AND DEPRESSIVE DISORDER: ICD-10-CM

## 2025-05-13 DIAGNOSIS — E78.5 HYPERLIPIDEMIA, UNSPECIFIED HYPERLIPIDEMIA TYPE: ICD-10-CM

## 2025-05-13 PROCEDURE — 36415 COLL VENOUS BLD VENIPUNCTURE: CPT | Performed by: PHYSICIAN ASSISTANT

## 2025-05-13 PROCEDURE — 80053 COMPREHEN METABOLIC PANEL: CPT | Performed by: PHYSICIAN ASSISTANT

## 2025-05-13 PROCEDURE — 85025 COMPLETE CBC W/AUTO DIFF WBC: CPT | Performed by: PHYSICIAN ASSISTANT

## 2025-05-13 RX ORDER — PROMETHAZINE HYDROCHLORIDE 25 MG/1
25 TABLET ORAL EVERY 6 HOURS PRN
Qty: 30 TABLET | Refills: 0 | Status: SHIPPED | OUTPATIENT
Start: 2025-05-13

## 2025-05-13 RX ORDER — METRONIDAZOLE 250 MG/1
250 TABLET ORAL
COMMUNITY
Start: 2025-05-11 | End: 2025-05-16

## 2025-05-13 RX ORDER — CIPROFLOXACIN 500 MG/1
500 TABLET, FILM COATED ORAL
COMMUNITY
Start: 2025-05-11 | End: 2025-05-16

## 2025-05-13 RX ORDER — HYDROXYZINE HYDROCHLORIDE 50 MG/1
50 TABLET, FILM COATED ORAL EVERY 6 HOURS PRN
Qty: 360 TABLET | Refills: 1 | Status: SHIPPED | OUTPATIENT
Start: 2025-05-13

## 2025-05-13 NOTE — PROGRESS NOTES
"[facility]  [address]  [facility] [address]  Dept: [phone]  Dept Fax: [phone]  Loc: [phone]  Loc Fax: [phone]    Nathalia Rao  1965    Follow Up Office Visit Note    History of Present Illness:  Pt is a 59 y/o female in today for ER follow up for abdominal pain. Course was as follows: \"Chief Complaint: Abdominal Pain (Via EMS. Pt c/o epigastric pain x 1 week, pain worsened in the last few days with n/v/d. Has been ongoing problem for some time, has seen her pcp and had a colonoscopy, pending further testing for dx. EMS gave 1.25mg droperidol en route. )    Present illness: The patient is 60-year-old female who has abdominal pain and vomiting diarrhea since 1 week ago which become worse since 3 days ago. No fever no chills. Patient has done colonoscopy on 24 April.\" Ct scan showed colitis and cirrhosis. Needing referral to GI.          The following portions of the patient's history were reviewed and updated as appropriate: allergies, current medications, past family history, past medical history, past social history, past surgical history, and problem list.    Medications:    Current Outpatient Medications:     ARIPiprazole (Abilify) 2 MG tablet, Take 1 tablet by mouth Daily., Disp: 30 tablet, Rfl: 2    atorvastatin (LIPITOR) 10 MG tablet, Take 1 tablet by mouth Every Night., Disp: 90 tablet, Rfl: 1    Cholecalciferol (Vitamin D) 50 MCG (2000 UT) tablet, Take 1 tablet by mouth Daily., Disp: 90 tablet, Rfl: 1    ciprofloxacin (CIPRO) 500 MG tablet, Take 1 tablet by mouth., Disp: , Rfl:     dicyclomine (BENTYL) 20 MG tablet, Take 1/2-1 tablet by mouth 4 times daily (every 4-6 hours) as needed for diarrhea., Disp: 60 tablet, Rfl: 2    hydrOXYzine (ATARAX) 50 MG tablet, Take 1 tablet by mouth Every 6 (Six) Hours As Needed for Anxiety., Disp: 360 tablet, Rfl: 1    metroNIDAZOLE (FLAGYL) 250 MG tablet, Take 1 tablet by " mouth., Disp: , Rfl:     ondansetron (Zofran) 4 MG tablet, Take 1 tablet by mouth Every 8 (Eight) Hours As Needed for Nausea or Vomiting. (Patient not taking: Reported on 5/13/2025), Disp: 15 tablet, Rfl: 0    promethazine (PHENERGAN) 25 MG tablet, Take 1 tablet by mouth Every 6 (Six) Hours As Needed for Nausea or Vomiting., Disp: 30 tablet, Rfl: 0    Subjective  No Known Allergies     Past Medical History:   Diagnosis Date    Abnormal ECG 3/2025    ADHD (attention deficit hyperactivity disorder) 20 years ago    Alcoholism     Anxiety     Arthritis About 10 years ago    Getting worse    Bulging lumbar disc     Bulging of thoracic intervertebral disc     Chronic pain disorder 2006    Depression     Fibromyalgia     Fibromyalgia, primary About 6 years ago    H/O pulmonary function tests 05/09/2019    no obstruction, moderate restriction    Hepatitis C 2001    peg interferon, ribaviron    Hyperlipidemia 6 years ago    Hypertension     Injury of back     Irritable bowel syndrome 30 years ago    Low back pain 2003    Progressively worse    Murmur, cardiac     Neuropathy     PTSD (post-traumatic stress disorder)     Raynaud's disease     Scoliosis     Shingles     Substance abuse     Visual impairment 1 year ago       Past Surgical History:   Procedure Laterality Date    BACK SURGERY      CHOLECYSTECTOMY  2001    COLONOSCOPY  2013    patient states twisted colon    SPINAL FUSION  2006    TOTAL ABDOMINAL HYSTERECTOMY WITH SALPINGO OOPHORECTOMY  11/2014    ovarian cysts       Family History   Problem Relation Age of Onset    Uterine cancer Mother     Hypertension Mother     Hyperlipidemia Mother     Anxiety disorder Mother     Cancer Mother     Depression Mother     Heart disease Mother     Mental illness Mother     Stomach cancer Father     Colon cancer Father     Inflammatory bowel disease Father     Alcohol abuse Father     Cancer Father     Mental illness Father     Diabetes Maternal Grandmother     Hypertension  Maternal Grandmother     Hyperlipidemia Maternal Grandmother     Stroke Maternal Grandmother     Drug abuse Maternal Grandmother     Mental illness Maternal Grandmother     Heart attack Maternal Grandfather     Hypertension Maternal Grandfather         Social History     Socioeconomic History    Marital status:    Tobacco Use    Smoking status: Every Day     Current packs/day: 0.00     Average packs/day: 0.5 packs/day for 5.0 years (2.5 ttl pk-yrs)     Types: Cigarettes     Start date: 2013     Last attempt to quit: 2018     Years since quittin.3     Passive exposure: Current    Smokeless tobacco: Never   Vaping Use    Vaping status: Former    Passive vaping exposure: Yes   Substance and Sexual Activity    Alcohol use: Not Currently    Drug use: Not Currently     Types: Cocaine(coke)     Comment: Alcohol    Sexual activity: Yes     Partners: Female     Birth control/protection: Post-menopausal, None, Hysterectomy       Review of Systems   Constitutional:  Negative for activity change, chills, fatigue, fever and unexpected weight change.   HENT:  Negative for congestion, ear pain, postnasal drip, sinus pressure and sore throat.    Eyes:  Negative for pain, discharge and redness.   Respiratory:  Negative for cough, shortness of breath and wheezing.    Cardiovascular:  Negative for chest pain, palpitations and leg swelling.   Gastrointestinal:  Positive for nausea. Negative for diarrhea and vomiting.   Endocrine: Negative for cold intolerance and heat intolerance.   Genitourinary:  Negative for decreased urine volume and dysuria.   Musculoskeletal:  Negative for arthralgias and myalgias.   Skin:  Negative for rash and wound.   Neurological:  Negative for dizziness, light-headedness and headaches.   Hematological:  Does not bruise/bleed easily.   Psychiatric/Behavioral:  Negative for confusion, dysphoric mood and sleep disturbance. The patient is not nervous/anxious.          Objective  Vitals:     "05/13/25 0857   BP: 130/68   BP Location: Left arm   Patient Position: Sitting   Cuff Size: Adult   Pulse: 89   Resp: 18   Temp: 97.1 °F (36.2 °C)   TempSrc: Temporal   SpO2: 98%   Weight: 66 kg (145 lb 6.4 oz)   Height: 166.4 cm (65.51\")   PainSc: 2    PainLoc: Abdomen     Body mass index is 23.82 kg/m².     Physical Exam  Physical Exam  Vitals and nursing note reviewed.   Constitutional:       General: She is not in acute distress.     Appearance: She is not ill-appearing.   HENT:      Head: Normocephalic.      Right Ear: Tympanic membrane, ear canal and external ear normal. There is no impacted cerumen.      Left Ear: Tympanic membrane, ear canal and external ear normal. There is no impacted cerumen.      Nose: No congestion or rhinorrhea.      Mouth/Throat:      Mouth: Mucous membranes are moist.      Pharynx: Oropharynx is clear. No oropharyngeal exudate or posterior oropharyngeal erythema.   Eyes:      General:         Right eye: No discharge.         Left eye: No discharge.      Extraocular Movements: Extraocular movements intact.      Conjunctiva/sclera: Conjunctivae normal.      Pupils: Pupils are equal, round, and reactive to light.   Cardiovascular:      Rate and Rhythm: Normal rate and regular rhythm.      Heart sounds: Normal heart sounds. No murmur heard.     No friction rub. No gallop.   Pulmonary:      Effort: Pulmonary effort is normal. No respiratory distress.      Breath sounds: Normal breath sounds. No wheezing.   Abdominal:      General: Bowel sounds are normal. There is no distension.      Palpations: Abdomen is soft. There is no mass.      Tenderness: There is abdominal tenderness.   Musculoskeletal:         General: No swelling. Normal range of motion.      Cervical back: Normal range of motion. No tenderness.      Right lower leg: No edema.      Left lower leg: No edema.   Lymphadenopathy:      Cervical: No cervical adenopathy.   Skin:     Findings: No bruising, erythema or rash. "   Neurological:      Mental Status: She is oriented to person, place, and time.      Gait: Gait normal.   Psychiatric:         Mood and Affect: Mood normal.         Behavior: Behavior normal.         Thought Content: Thought content normal.         Judgment: Judgment normal.         Diagnostic Data  Procedures    Assessment  Diagnoses and all orders for this visit:    1. Colitis (Primary)  -     Ambulatory Referral to Gastroenterology  -     CBC w AUTO Differential; Future  -     Comprehensive Metabolic Panel    2. Hepatic cirrhosis, unspecified hepatic cirrhosis type, unspecified whether ascites present  -     Cancel: Ambulatory Referral to Gastroenterology  -     Ambulatory Referral to Gastroenterology    3. Mixed anxiety and depressive disorder    4. Chronic nausea    5. Hyperlipidemia, unspecified hyperlipidemia type    Other orders  -     hydrOXYzine (ATARAX) 50 MG tablet; Take 1 tablet by mouth Every 6 (Six) Hours As Needed for Anxiety.  Dispense: 360 tablet; Refill: 1  -     promethazine (PHENERGAN) 25 MG tablet; Take 1 tablet by mouth Every 6 (Six) Hours As Needed for Nausea or Vomiting.  Dispense: 30 tablet; Refill: 0        Plan    1. Colitis (Primary)- overall better, advised if sx/condition does not cont to improve or worsen to go back to ER. Patient verbalized understanding of all instructions given and complied. Repeat cbc and cmp. Cont flagyl. Ref to GI.    2. Hepatic cirrhosis, unspecified hepatic cirrhosis type, unspecified whether ascites present- ref urgently to GI.    3. Mixed anxiety and depressive disorder- increase hydroxyzine. Cont abilify.    4. Chronic nausea- rx's phenergan.    5. Hyperlipidemia, unspecified hyperlipidemia type- compliant on lipitor.      Return for Next scheduled follow up.    Celso Rey PA-C  05/13/2025

## 2025-05-14 LAB
ALBUMIN SERPL-MCNC: 3.6 G/DL (ref 3.5–5.2)
ALBUMIN/GLOB SERPL: 1.1 G/DL
ALP SERPL-CCNC: 107 U/L (ref 39–117)
ALT SERPL W P-5'-P-CCNC: 29 U/L (ref 1–33)
ANION GAP SERPL CALCULATED.3IONS-SCNC: 12 MMOL/L (ref 5–15)
AST SERPL-CCNC: 73 U/L (ref 1–32)
BASOPHILS # BLD AUTO: 0.04 10*3/MM3 (ref 0–0.2)
BASOPHILS NFR BLD AUTO: 0.5 % (ref 0–1.5)
BILIRUB SERPL-MCNC: 1.9 MG/DL (ref 0–1.2)
BUN SERPL-MCNC: 5 MG/DL (ref 8–23)
BUN/CREAT SERPL: 6.5 (ref 7–25)
CALCIUM SPEC-SCNC: 8.8 MG/DL (ref 8.6–10.5)
CHLORIDE SERPL-SCNC: 99 MMOL/L (ref 98–107)
CO2 SERPL-SCNC: 25 MMOL/L (ref 22–29)
CREAT SERPL-MCNC: 0.77 MG/DL (ref 0.57–1)
DEPRECATED RDW RBC AUTO: 43.8 FL (ref 37–54)
EGFRCR SERPLBLD CKD-EPI 2021: 87.3 ML/MIN/1.73
EOSINOPHIL # BLD AUTO: 0.13 10*3/MM3 (ref 0–0.4)
EOSINOPHIL NFR BLD AUTO: 1.7 % (ref 0.3–6.2)
ERYTHROCYTE [DISTWIDTH] IN BLOOD BY AUTOMATED COUNT: 12.4 % (ref 12.3–15.4)
GLOBULIN UR ELPH-MCNC: 3.4 GM/DL
GLUCOSE SERPL-MCNC: 131 MG/DL (ref 65–99)
HCT VFR BLD AUTO: 35.9 % (ref 34–46.6)
HGB BLD-MCNC: 12.8 G/DL (ref 12–15.9)
IMM GRANULOCYTES # BLD AUTO: 0.09 10*3/MM3 (ref 0–0.05)
IMM GRANULOCYTES NFR BLD AUTO: 1.1 % (ref 0–0.5)
LYMPHOCYTES # BLD AUTO: 1.29 10*3/MM3 (ref 0.7–3.1)
LYMPHOCYTES NFR BLD AUTO: 16.4 % (ref 19.6–45.3)
MCH RBC QN AUTO: 35 PG (ref 26.6–33)
MCHC RBC AUTO-ENTMCNC: 35.7 G/DL (ref 31.5–35.7)
MCV RBC AUTO: 98.1 FL (ref 79–97)
MONOCYTES # BLD AUTO: 0.55 10*3/MM3 (ref 0.1–0.9)
MONOCYTES NFR BLD AUTO: 7 % (ref 5–12)
NEUTROPHILS NFR BLD AUTO: 5.75 10*3/MM3 (ref 1.7–7)
NEUTROPHILS NFR BLD AUTO: 73.3 % (ref 42.7–76)
NRBC BLD AUTO-RTO: 0 /100 WBC (ref 0–0.2)
PLATELET # BLD AUTO: 120 10*3/MM3 (ref 140–450)
PMV BLD AUTO: 10.1 FL (ref 6–12)
POTASSIUM SERPL-SCNC: 3.5 MMOL/L (ref 3.5–5.2)
PROT SERPL-MCNC: 7 G/DL (ref 6–8.5)
RBC # BLD AUTO: 3.66 10*6/MM3 (ref 3.77–5.28)
SODIUM SERPL-SCNC: 136 MMOL/L (ref 136–145)
WBC NRBC COR # BLD AUTO: 7.85 10*3/MM3 (ref 3.4–10.8)

## 2025-05-21 ENCOUNTER — TELEPHONE (OUTPATIENT)
Dept: GASTROENTEROLOGY | Facility: CLINIC | Age: 60
End: 2025-05-21
Payer: MEDICAID

## 2025-07-07 DIAGNOSIS — F33.2 SEVERE EPISODE OF RECURRENT MAJOR DEPRESSIVE DISORDER, WITHOUT PSYCHOTIC FEATURES: ICD-10-CM

## 2025-07-08 RX ORDER — ARIPIPRAZOLE 2 MG/1
2 TABLET ORAL DAILY
Qty: 30 TABLET | Refills: 2 | Status: SHIPPED | OUTPATIENT
Start: 2025-07-08

## 2025-07-31 ENCOUNTER — DOCUMENTATION (OUTPATIENT)
Dept: GASTROENTEROLOGY | Facility: CLINIC | Age: 60
End: 2025-07-31

## 2025-07-31 ENCOUNTER — OFFICE VISIT (OUTPATIENT)
Dept: GASTROENTEROLOGY | Facility: CLINIC | Age: 60
End: 2025-07-31
Payer: MEDICAID

## 2025-07-31 VITALS
SYSTOLIC BLOOD PRESSURE: 130 MMHG | DIASTOLIC BLOOD PRESSURE: 70 MMHG | HEIGHT: 65 IN | WEIGHT: 144 LBS | HEART RATE: 70 BPM | OXYGEN SATURATION: 98 % | BODY MASS INDEX: 23.99 KG/M2

## 2025-07-31 DIAGNOSIS — Z86.19 HISTORY OF HEPATITIS C: ICD-10-CM

## 2025-07-31 DIAGNOSIS — Z86.0101 HISTORY OF ADENOMATOUS POLYP OF COLON: ICD-10-CM

## 2025-07-31 DIAGNOSIS — D69.6 THROMBOCYTOPENIA: ICD-10-CM

## 2025-07-31 DIAGNOSIS — Z80.0 FAMILY HISTORY OF COLON CANCER IN FATHER: ICD-10-CM

## 2025-07-31 DIAGNOSIS — R53.83 MALAISE AND FATIGUE: ICD-10-CM

## 2025-07-31 DIAGNOSIS — R11.0 NAUSEA: Primary | ICD-10-CM

## 2025-07-31 DIAGNOSIS — R16.1 SPLENOMEGALY: ICD-10-CM

## 2025-07-31 DIAGNOSIS — K64.8 INTERNAL HEMORRHOIDS: ICD-10-CM

## 2025-07-31 DIAGNOSIS — R53.81 MALAISE AND FATIGUE: ICD-10-CM

## 2025-07-31 DIAGNOSIS — K74.69 OTHER CIRRHOSIS OF LIVER: ICD-10-CM

## 2025-07-31 DIAGNOSIS — K76.82 HEPATIC ENCEPHALOPATHY: ICD-10-CM

## 2025-07-31 DIAGNOSIS — R74.8 ABNORMAL LIVER ENZYMES: ICD-10-CM

## 2025-07-31 DIAGNOSIS — R93.5 ABNORMAL ABDOMINAL CT SCAN: ICD-10-CM

## 2025-07-31 PROBLEM — F98.8 ATTENTION DEFICIT DISORDER: Status: ACTIVE | Noted: 2025-07-31

## 2025-07-31 PROBLEM — F17.210 NICOTINE DEPENDENCE, CIGARETTES, UNCOMPLICATED: Status: ACTIVE | Noted: 2025-07-31

## 2025-07-31 PROBLEM — Z72.0 TOBACCO USER: Status: ACTIVE | Noted: 2025-07-31

## 2025-07-31 PROBLEM — R22.30 MASS OF ARM: Status: ACTIVE | Noted: 2025-07-31

## 2025-07-31 PROBLEM — J06.9 VIRAL URI: Status: ACTIVE | Noted: 2025-07-31

## 2025-07-31 PROBLEM — I10 HYPERTENSION: Status: ACTIVE | Noted: 2025-07-31

## 2025-07-31 PROCEDURE — 1159F MED LIST DOCD IN RCRD: CPT | Performed by: NURSE PRACTITIONER

## 2025-07-31 PROCEDURE — 3075F SYST BP GE 130 - 139MM HG: CPT | Performed by: NURSE PRACTITIONER

## 2025-07-31 PROCEDURE — 99214 OFFICE O/P EST MOD 30 MIN: CPT | Performed by: NURSE PRACTITIONER

## 2025-07-31 PROCEDURE — 3078F DIAST BP <80 MM HG: CPT | Performed by: NURSE PRACTITIONER

## 2025-07-31 PROCEDURE — 1160F RVW MEDS BY RX/DR IN RCRD: CPT | Performed by: NURSE PRACTITIONER

## 2025-07-31 RX ORDER — LACTULOSE 10 G/15ML
10 SOLUTION ORAL 2 TIMES DAILY PRN
Qty: 2700 ML | Refills: 3 | Status: SHIPPED | OUTPATIENT
Start: 2025-07-31

## 2025-07-31 RX ORDER — ESOMEPRAZOLE MAGNESIUM 40 MG/1
40 CAPSULE, DELAYED RELEASE ORAL DAILY
Qty: 90 CAPSULE | Refills: 3 | Status: SHIPPED | OUTPATIENT
Start: 2025-07-31

## 2025-07-31 NOTE — PROGRESS NOTES
GASTROENTEROLOGY OFFICE NOTE    Nathalia Rao  [id number]  1965    CARE TEAM  Patient Care Team:  Celso Rey PA-C as PCP - General (Physician Assistant)  Elvin Gresham APRN (Nurse Practitioner)    Referring Provider: Celso Rey, *    Chief Complaint   Patient presents with    Cirrhosis     New patient.     Ulcerative Colitis     New patient        HISTORY OF PRESENT ILLNESS:   Nathalia Rao is a 60 y.o. female who presents as a referral from Celso Rey regarding abdominal pain, nausea, vomiting, diarrhea for which patient was previously evaluated in the emergency department.    Colonoscopy 4/2025 per dr. Brasher as below    5/11/2025 CT abdomen and pelvis at [facility] revealed diffuse fatty infiltration of liver with evidence of cirrhosis, splenomegaly with spleen measuring up to 13 cm, several small esophageal varices, small amount of ascites, mild wall thickening of small bowel without obstruction, wall thickening of ascending and transverse colon with pericolonic inflammatory change suggestive of enterocolitis    Ciprofloxacin and metronidazole prescribed for 5 days.      CBC revealed thrombocytopenia 4/11/2025 with platelet count 136 and 513 2025 with platelet count of 120.  Comprehensive metabolic panel revealed hyperglycemia with glucose 131, BUN low 5, AST elevated 73, hyperbilirubinemia with result of 1.9 (improved from prior result on 4/11/2025 2.2)    Ultrasound liver previously ordered by Mr. Rey and scheduled for 5/21/2025 but patient was a no-show for appointment    prior evaluation by Dr. Love 4/22/2016 with review of documentation that revealed patient to EGD and colonoscopy 2 years prior that were all within normal limits.  Patient takes MiraLAX with good relief.  Presented to discuss if she needs Amitiza or Movantik.  It was documented patient was on high-fiber diet.  It was documented that it was recommended for patient to see  endocrinology and she was brought to see gastroenterology in error.  Endocrinology evaluation recommended.  Patient has multiple chronic unusual complaints.  No reason to repeat EGD nor colonoscopy, stools are soft on MiraLAX as needed, not adding Amitiza nor Movantik and return as needed      History of Present Illness  The patient is a 60-year-old female who presents for evaluation of abnormal imaging concerning cirrhosis of the liver as well as colitis    She reports experiencing gastrointestinal symptoms such as nausea, loss of appetite, pain, and cramps. She also mentions a history of colitis, which was discovered concurrently with her cirrhosis diagnosis. Her primary complaint is gastrointestinal pain, which causes discomfort. She also reports sluggishness. She feels constantly fatigued and has severe skin itching. She describes her current state as unwell, although improved from two months ago when she was seen in the ED. She is currently not working due to her health issues.     She has been focusing on her diet and bowel movements, which have recently improved from a pattern of diarrhea and constipation to more regular, formed stools. She does not use MiraLAX currently but did use it as needed in 2016. She takes dicyclomine intermittently to manage her IBS and finds it helpful. She does not regularly experience diarrhea but has had episodes in the past.    She has nausea. She suspects she may have heartburn or reflux symptoms. She has not undergone EGD in the past. She has been prescribed antibiotics in the past, which she found helpful. She is curious about the potential benefits of turmeric for liver health. She has noticed a mild red tint and foaminess in her urine.    She was treated for hepatitis C in 2002 with interferon and ribavirin for 13 months, resulting in undetectable levels of the virus. She has been tested periodically since then, with a negative result in 2019. She does not believe she has  been exposed to the virus since 2019. She has abstained from alcohol for 36 years after a period of heavy drinking.    She experiences swelling in her ankles and occasional brain fog. She is making efforts to understand her body's signals and respond appropriately. She occasionally uses ibuprofen and Aleve for pain relief. She had a Tylenol overdose several years ago.    She was taking vitamin D supplements until about a month and a half ago when she ran out. She was previously diagnosed with a vitamin D deficiency.    She smokes cigarettes and is seeking assistance to quit smoking.    SOCIAL HISTORY  Occupation: Not working currently  Alcohol: No alcohol consumption in 36 years, previously an alcoholic.  Tobacco: Uses tobacco products, seeking help to quit.  Coffee/Tea/Caffeine-containing Drinks: Drinks soda, not coffee.    FAMILY HISTORY  - Father: Stomach cancer, cirrhosis (possibly due to alcohol use), colon cancer  - Mother: Uterine cancer      Past Medical History:   Diagnosis Date    Abnormal ECG 3/2025    ADHD (attention deficit hyperactivity disorder) 20 years ago    Alcoholism     Anxiety     Arthritis About 10 years ago    Getting worse    Bulging lumbar disc     Bulging of thoracic intervertebral disc     Chronic pain disorder 2006    Cirrhosis     Colitis     Depression     Fibromyalgia     Fibromyalgia, primary About 6 years ago    H/O pulmonary function tests 05/09/2019    no obstruction, moderate restriction    Hepatitis C 2001    peg interferon, ribaviron    Hyperlipidemia 6 years ago    Hypertension     Injury of back     Irritable bowel syndrome 30 years ago    Low back pain 2003    Progressively worse    Murmur, cardiac     Neuropathy     PTSD (post-traumatic stress disorder)     Raynaud's disease     Scoliosis     Shingles     Substance abuse     Visual impairment 1 year ago        Past Surgical History:   Procedure Laterality Date    BACK SURGERY      CHOLECYSTECTOMY  2001    COLONOSCOPY  2013     patient states twisted colon    COLONOSCOPY  04/2025    [facility]    SPINAL FUSION  2006    TOTAL ABDOMINAL HYSTERECTOMY WITH SALPINGO OOPHORECTOMY  11/2014    ovarian cysts        Current Outpatient Medications on File Prior to Visit   Medication Sig    atorvastatin (LIPITOR) 10 MG tablet Take 1 tablet by mouth Every Night.    Cholecalciferol (Vitamin D) 50 MCG (2000 UT) tablet Take 1 tablet by mouth Daily.    dicyclomine (BENTYL) 20 MG tablet Take 1/2-1 tablet by mouth 4 times daily (every 4-6 hours) as needed for diarrhea.    hydrOXYzine (ATARAX) 50 MG tablet Take 1 tablet by mouth Every 6 (Six) Hours As Needed for Anxiety.    ARIPiprazole (ABILIFY) 2 MG tablet TAKE 1 TABLET BY MOUTH DAILY (Patient not taking: Reported on 7/31/2025)    ondansetron (Zofran) 4 MG tablet Take 1 tablet by mouth Every 8 (Eight) Hours As Needed for Nausea or Vomiting. (Patient not taking: Reported on 7/31/2025)    promethazine (PHENERGAN) 25 MG tablet Take 1 tablet by mouth Every 6 (Six) Hours As Needed for Nausea or Vomiting. (Patient not taking: Reported on 7/31/2025)     No current facility-administered medications on file prior to visit.       No Known Allergies    Family History   Problem Relation Age of Onset    Uterine cancer Mother     Hypertension Mother     Hyperlipidemia Mother     Anxiety disorder Mother     Cancer Mother     Depression Mother     Heart disease Mother     Mental illness Mother     Stomach cancer Father     Colon cancer Father     Inflammatory bowel disease Father     Alcohol abuse Father     Cancer Father     Mental illness Father     Diabetes Maternal Grandmother     Hypertension Maternal Grandmother     Hyperlipidemia Maternal Grandmother     Stroke Maternal Grandmother     Drug abuse Maternal Grandmother     Mental illness Maternal Grandmother     Heart attack Maternal Grandfather     Hypertension Maternal Grandfather        Social History     Socioeconomic History    Marital status:    Tobacco Use  "   Smoking status: Every Day     Current packs/day: 0.00     Average packs/day: 0.5 packs/day for 5.0 years (2.5 ttl pk-yrs)     Types: Cigarettes     Start date: 2013     Last attempt to quit: 2018     Years since quittin.5     Passive exposure: Current    Smokeless tobacco: Never   Vaping Use    Vaping status: Former    Passive vaping exposure: Yes   Substance and Sexual Activity    Alcohol use: Not Currently    Drug use: Not Currently     Types: Cocaine(coke)     Comment: Alcohol    Sexual activity: Yes     Partners: Female     Birth control/protection: Post-menopausal, None, Hysterectomy       PHYSICAL EXAM   /70 (BP Location: Left arm, Patient Position: Sitting, Cuff Size: Adult)   Pulse 70   Ht 165.1 cm (65\")   Wt 65.3 kg (144 lb)   SpO2 98%   BMI 23.96 kg/m²   Physical Exam  Constitutional:       General: She is not in acute distress.     Appearance: She is not toxic-appearing.   HENT:      Head: Normocephalic and atraumatic. No contusion.      Right Ear: External ear normal.      Left Ear: External ear normal.   Eyes:      General: Lids are normal. No scleral icterus.        Right eye: No discharge.         Left eye: No discharge.      Extraocular Movements: Extraocular movements intact.   Neck:      Trachea: Trachea normal.      Comments: No visible mass  No visible adenopathy  Cardiovascular:      Rate and Rhythm: Normal rate.   Pulmonary:      Effort: No respiratory distress.      Comments: Symmetrical expansion    Abdominal:      Palpations: There is no mass.      Tenderness: There is no abdominal tenderness.      Comments: Possible abdominal distention     Musculoskeletal:      Comments: Symmetrical movement of upper extremities  Symmetrical movement of lower extremities  No visible deformities   Skin:     General: Skin is warm and dry.      Coloration: Skin is not jaundiced.      Comments: + spider angiomas   Neurological:      Mental Status: She is alert.   Psychiatric:         " Mood and Affect: Mood normal.         Behavior: Behavior normal.         Thought Content: Thought content normal.         Results Review:  HCV quant negative in 2019    4/24/2025 colonoscopy per Dr. Brasher due to diarrhea over the past 1.5 years, family history of colon cancer in her father, 120 pound weight loss over the past 18 months revealed 1 cm sessile polyp in proximal ascending colon; 1 cm sessile polyp in the mid transverse colon, random colon biopsies obtained, grade 1 internal hemorrhoids, sphincter tone normal.  Right colon biopsy without abnormality.  Left colon biopsy without abnormality.  Rectal biopsy without abnormality.  Right colon polyp tubular adenoma.  Transverse colon polyp tubular adenoma.  Repeat colonoscopy recommended in 3 years.    5/11/2025 CT abdomen and pelvis at [facility] revealed diffuse fatty infiltration of liver with evidence of cirrhosis, splenomegaly with spleen measuring up to 13 cm, several small esophageal varices, small amount of ascites, mild wall thickening of small bowel without obstruction, wall thickening of ascending and transverse colon with pericolonic inflammatory change suggestive of enterocolitis  CMP          4/11/2025    10:16 5/13/2025    09:49   CMP   Glucose 117  131    BUN 4  5    Creatinine 0.86  0.77    EGFR 77.0  87.3    Sodium 140  136    Potassium 3.4  3.5    Chloride 101  99    Calcium 9.4  8.8    Total Protein 7.4  7.0    Albumin 3.9  3.6    Globulin 3.5  3.4    Total Bilirubin 2.2  1.9    Alkaline Phosphatase 122  107    AST (SGOT) 84  73    ALT (SGPT) 28  29    Albumin/Globulin Ratio 1.1  1.1    BUN/Creatinine Ratio 4.7  6.5    Anion Gap 14.0  12.0      CBC          4/11/2025    10:16 5/13/2025    09:49   CBC   WBC 6.93  7.85    RBC 4.08  3.66    Hemoglobin 14.4  12.8    Hematocrit 40.4  35.9    MCV 99.0  98.1    MCH 35.3  35.0    MCHC 35.6  35.7    RDW 13.0  12.4    Platelets 136  120        ASSESSMENT / PLAN    Assessment & Plan      1.  Nausea  - start esomeprazole daily ( preferred PPI with diagnosis of cirrhosis) and plan for EGD for additional evaluation  - try to achieve 2 to 3 productive bowel movements per day (this could also be helpful for possible distention of abdomen, she may have ascites as well contributing, see below  - esomeprazole (nexIUM) 40 MG capsule; Take 1 capsule by mouth Daily. 30 minutes prior to a meal  Dispense: 90 capsule; Refill: 3    2. Other cirrhosis of liver  3. Splenomegaly  4. History of hepatitis C  5. Thrombocytopenia  6. Abnormal liver enzymes  Suspect cirrhosis based off abnormal CT scan completed 5/2025 during ED visit for N/V/D. Etiology of cirrhosis suspected to be due to history of HCV (s/p tx in 2002 with interferon and ribavirin, negative HCV quant 2019, repeat HCV quant) and history of alcohol use (sober x 36 years, continue to avoid alcohol). Due to elevated liver enzymes send work up as below  MELD Na to be calculated from labs ordered today  US q 6 months for HCC screening, recommend patient call central scheduling to reschedule previously ordered ultrasound by Mr. Rey  Small Ascites Noted at time of CT scan 5/2025, recommend sodium restricted diet and schedule ultrasound to reevaluate for ascites.  I have some concern she may be experiencing recurrent ascites due to mention of distention of abdomen.  If sodium restricted diet does not seem helpful and imaging reveals ascites consider diuretics  No mass on prior imaging  HE - see below  EGD to screen for varices due and scheduled today for a future visit  CRC screen due 4/2028  Evaluate for hepatitis A and B immunity   Recommend daily protein intake of 90 grams  Limit salt/sodium intake to no more than 2 grams or 2,000 mg per day.   Avoid NSAIDs such as ibuprofen, Advil, Motrin, diclofenac, meloxicam, naproxen. Limit use of acetaminophen to no more than 2,000 mg per day (patient may take acetaminophen 500 mg q 6 hours as needed for pain).  Avoid  alcohol, tobacco and raw shellfish  Will check vitamin D level to evaluate for deficiency and recommend replacement of vitamin D and calcium if low.  She has history of vitamin D deficiency    - AFP Tumor Marker; Future  - CBC (No Diff); Future  - Comprehensive Metabolic Panel; Future  - Protime-INR; Future  - Vitamin D,25-Hydroxy  - Hepatitis A Antibody, Total; Future  - Hepatitis B Surface Antibody; Future  - HCV RNA By PCR, Qn Rfx Malina; Future  - Ammonia; Future  - Mitochondrial Antibodies, M2; Future  - Iron Profile w/o Ferritin; Future  - IgG, IgA, IgM; Future  - Hepatitis B Surface Antigen; Future  - Hepatitis B Core Antibody, Total; Future  - Hemochromatosis Mutation; Future  - Ferritin; Future  - Anti-Smooth Muscle Antibody Titer; Future  - Anti-microsomal Antibody; Future  - JOÃO With / DsDNA, RNP, Sjogrens A / B, Smith; Future  - Alpha - 1 - Antitrypsin Phenotype; Future    7. Hepatic encephalopathy  8.  fatigue  - She has some symptoms concerning for possible hepatic encephalopathy for which I recommend she take Xifaxan twice daily and titrate use of lactulose to achieve 2-3 bowel movements per day  -Ammonia level ordered   - lactulose (CHRONULAC) 10 GM/15ML solution; Take 15 mL by mouth 2 (Two) Times a Day As Needed (to achieve 2 to 3 bowel movements per day).  Dispense: 2700 mL; Refill: 3  - riFAXIMin (XIFAXAN) 550 MG tablet; Take 1 tablet by mouth 2 (Two) Times a Day.  Dispense: 180 tablet; Refill: 3  9. Abnormal abdominal CT scan  5/11/2025 CT abdomen and pelvis at [facility] revealed diffuse fatty infiltration of liver with evidence of cirrhosis, splenomegaly with spleen measuring up to 13 cm, several small esophageal varices, small amount of ascites (see treatment plan for cirrhosis above), mild wall thickening of small bowel without obstruction, wall thickening of ascending and transverse colon with pericolonic inflammatory change suggestive of enterocolitis (suspect possible infectious  etiology at the time. Symptoms improved after taking cipro and metronidazole, very recent colonoscopy a few weeks prior to CT, do not plan to repeat colonoscopy until she is due for surveillance unless symptoms and/or abnormal imaging in the future    10. History of adenomatous polyp of colon  11. Family history of colon cancer in father  12. Internal hemorrhoids  4/24/2025 colonoscopy per Dr. Brasher due to diarrhea over the past 1.5 years, family history of colon cancer in her father, 120 pound weight loss over the past 18 months revealed 1 cm sessile polyp in proximal ascending colon; 1 cm sessile polyp in the mid transverse colon, random colon biopsies obtained, grade 1 internal hemorrhoids, sphincter tone normal.  Right colon biopsy without abnormality.  Left colon biopsy without abnormality.  Rectal biopsy without abnormality.  Right colon polyp tubular adenoma.  Transverse colon polyp tubular adenoma.  Repeat colonoscopy recommended in 3 years due 4/24/2028      Return for Follow-up after EGD.    Patient or patient representative verbalized consent for the use of Ambient Listening during the visit with  JUAN DIEGO Conrad for chart documentation. 7/31/2025  0950 EDT    JUAN DIEGO Conrad  07/31/2025

## 2025-08-05 ENCOUNTER — TELEPHONE (OUTPATIENT)
Dept: GASTROENTEROLOGY | Facility: CLINIC | Age: 60
End: 2025-08-05
Payer: MEDICAID

## 2025-08-06 ENCOUNTER — LAB (OUTPATIENT)
Dept: INTERNAL MEDICINE | Age: 60
End: 2025-08-06
Payer: MEDICAID

## 2025-08-06 DIAGNOSIS — R74.8 ABNORMAL LIVER ENZYMES: ICD-10-CM

## 2025-08-06 DIAGNOSIS — K74.69 OTHER CIRRHOSIS OF LIVER: ICD-10-CM

## 2025-08-06 LAB
25(OH)D3 SERPL-MCNC: 31.4 NG/ML (ref 30–100)
ALPHA-FETOPROTEIN: 4.47 NG/ML (ref 0–8.3)
AMMONIA BLD-SCNC: 39 UMOL/L (ref 11–51)
DEPRECATED RDW RBC AUTO: 47.5 FL (ref 37–54)
ERYTHROCYTE [DISTWIDTH] IN BLOOD BY AUTOMATED COUNT: 13.3 % (ref 12.3–15.4)
HBV SURFACE AB SER RIA-ACNC: NORMAL
HCT VFR BLD AUTO: 35.6 % (ref 34–46.6)
HGB BLD-MCNC: 12.3 G/DL (ref 12–15.9)
INR PPP: 1.49 (ref 0.89–1.12)
MCH RBC QN AUTO: 34.3 PG (ref 26.6–33)
MCHC RBC AUTO-ENTMCNC: 34.6 G/DL (ref 31.5–35.7)
MCV RBC AUTO: 99.2 FL (ref 79–97)
PLATELET # BLD AUTO: 100 10*3/MM3 (ref 140–450)
PMV BLD AUTO: 10.2 FL (ref 6–12)
PROTHROMBIN TIME: 19 SECONDS (ref 12.2–15.3)
RBC # BLD AUTO: 3.59 10*6/MM3 (ref 3.77–5.28)
WBC NRBC COR # BLD AUTO: 4.22 10*3/MM3 (ref 3.4–10.8)

## 2025-08-06 PROCEDURE — 86704 HEP B CORE ANTIBODY TOTAL: CPT | Performed by: NURSE PRACTITIONER

## 2025-08-06 PROCEDURE — 87522 HEPATITIS C REVRS TRNSCRPJ: CPT | Performed by: NURSE PRACTITIONER

## 2025-08-06 PROCEDURE — 86015 ACTIN ANTIBODY EACH: CPT | Performed by: NURSE PRACTITIONER

## 2025-08-06 PROCEDURE — 82728 ASSAY OF FERRITIN: CPT | Performed by: NURSE PRACTITIONER

## 2025-08-06 PROCEDURE — 86376 MICROSOMAL ANTIBODY EACH: CPT | Performed by: NURSE PRACTITIONER

## 2025-08-06 PROCEDURE — 85610 PROTHROMBIN TIME: CPT | Performed by: NURSE PRACTITIONER

## 2025-08-06 PROCEDURE — 81256 HFE GENE: CPT | Performed by: NURSE PRACTITIONER

## 2025-08-06 PROCEDURE — 82104 ALPHA-1-ANTITRYPSIN PHENO: CPT | Performed by: NURSE PRACTITIONER

## 2025-08-06 PROCEDURE — 86708 HEPATITIS A ANTIBODY: CPT | Performed by: NURSE PRACTITIONER

## 2025-08-06 PROCEDURE — 82140 ASSAY OF AMMONIA: CPT | Performed by: NURSE PRACTITIONER

## 2025-08-06 PROCEDURE — 36415 COLL VENOUS BLD VENIPUNCTURE: CPT | Performed by: PHYSICIAN ASSISTANT

## 2025-08-06 PROCEDURE — 80053 COMPREHEN METABOLIC PANEL: CPT | Performed by: NURSE PRACTITIONER

## 2025-08-06 PROCEDURE — 85027 COMPLETE CBC AUTOMATED: CPT | Performed by: NURSE PRACTITIONER

## 2025-08-06 PROCEDURE — 86038 ANTINUCLEAR ANTIBODIES: CPT | Performed by: NURSE PRACTITIONER

## 2025-08-06 PROCEDURE — 83540 ASSAY OF IRON: CPT | Performed by: NURSE PRACTITIONER

## 2025-08-06 PROCEDURE — 82105 ALPHA-FETOPROTEIN SERUM: CPT | Performed by: NURSE PRACTITIONER

## 2025-08-06 PROCEDURE — 84466 ASSAY OF TRANSFERRIN: CPT | Performed by: NURSE PRACTITIONER

## 2025-08-06 PROCEDURE — 87340 HEPATITIS B SURFACE AG IA: CPT | Performed by: NURSE PRACTITIONER

## 2025-08-06 PROCEDURE — 82103 ALPHA-1-ANTITRYPSIN TOTAL: CPT | Performed by: NURSE PRACTITIONER

## 2025-08-06 PROCEDURE — 82784 ASSAY IGA/IGD/IGG/IGM EACH: CPT | Performed by: NURSE PRACTITIONER

## 2025-08-06 PROCEDURE — 86381 MITOCHONDRIAL ANTIBODY EACH: CPT | Performed by: NURSE PRACTITIONER

## 2025-08-06 PROCEDURE — 82306 VITAMIN D 25 HYDROXY: CPT | Performed by: NURSE PRACTITIONER

## 2025-08-06 PROCEDURE — 86706 HEP B SURFACE ANTIBODY: CPT | Performed by: NURSE PRACTITIONER

## 2025-08-07 LAB
ALBUMIN SERPL-MCNC: 3.8 G/DL (ref 3.5–5.2)
ALBUMIN/GLOB SERPL: 1.1 G/DL
ALP SERPL-CCNC: 118 U/L (ref 39–117)
ALT SERPL W P-5'-P-CCNC: 40 U/L (ref 1–33)
ANION GAP SERPL CALCULATED.3IONS-SCNC: 11.1 MMOL/L (ref 5–15)
AST SERPL-CCNC: 78 U/L (ref 1–32)
BILIRUB SERPL-MCNC: 2.2 MG/DL (ref 0–1.2)
BUN SERPL-MCNC: 6 MG/DL (ref 8–23)
BUN/CREAT SERPL: 6.5 (ref 7–25)
CALCIUM SPEC-SCNC: 8.9 MG/DL (ref 8.6–10.5)
CHLORIDE SERPL-SCNC: 106 MMOL/L (ref 98–107)
CO2 SERPL-SCNC: 23.9 MMOL/L (ref 22–29)
CREAT SERPL-MCNC: 0.93 MG/DL (ref 0.57–1)
EGFRCR SERPLBLD CKD-EPI 2021: 69.6 ML/MIN/1.73
FERRITIN SERPL-MCNC: 217 NG/ML (ref 13–150)
GLOBULIN UR ELPH-MCNC: 3.4 GM/DL
GLUCOSE SERPL-MCNC: 102 MG/DL (ref 65–99)
HBV SURFACE AG SERPL QL IA: NORMAL
IGA1 MFR SER: 416 MG/DL (ref 70–400)
IGG1 SER-MCNC: 1466 MG/DL (ref 700–1600)
IGM SERPL-MCNC: 251 MG/DL (ref 40–230)
IRON 24H UR-MRATE: 123 MCG/DL (ref 37–145)
IRON SATN MFR SERPL: 35 % (ref 20–50)
POTASSIUM SERPL-SCNC: 3.8 MMOL/L (ref 3.5–5.2)
PROT SERPL-MCNC: 7.2 G/DL (ref 6–8.5)
SODIUM SERPL-SCNC: 141 MMOL/L (ref 136–145)
TIBC SERPL-MCNC: 355 MCG/DL (ref 298–536)
TRANSFERRIN SERPL-MCNC: 238 MG/DL (ref 200–360)

## 2025-08-08 LAB
ANA SER QL: NEGATIVE
HAV AB SER QL IA: NEGATIVE
HBV CORE AB SERPL QL IA: NEGATIVE
LKM-1 AB SER-ACNC: 1.9 UNITS (ref 0–20)
MITOCHONDRIA M2 IGG SER-ACNC: <20 UNITS (ref 0–20)
SMA IGG SER-ACNC: 4 UNITS (ref 0–19)

## 2025-08-11 LAB
HCV GENTYP SERPL NAA+PROBE: NORMAL
HCV RNA SERPL NAA+PROBE-ACNC: NORMAL IU/ML
HCV RNA SERPL NAA+PROBE-LOG IU: NORMAL LOG10 IU/ML
HFE GENE MUT ANL BLD/T: NORMAL
IMP & REVIEW OF LAB RESULTS: NORMAL
LABORATORY COMMENT REPORT: NORMAL

## 2025-08-13 ENCOUNTER — OUTSIDE FACILITY SERVICE (OUTPATIENT)
Dept: GASTROENTEROLOGY | Facility: CLINIC | Age: 60
End: 2025-08-13
Payer: MEDICAID

## 2025-08-13 ENCOUNTER — LAB REQUISITION (OUTPATIENT)
Dept: LAB | Facility: HOSPITAL | Age: 60
End: 2025-08-13
Payer: MEDICAID

## 2025-08-13 DIAGNOSIS — K21.9 GASTRO-ESOPHAGEAL REFLUX DISEASE WITHOUT ESOPHAGITIS: ICD-10-CM

## 2025-08-13 DIAGNOSIS — R11.2 NAUSEA WITH VOMITING, UNSPECIFIED: ICD-10-CM

## 2025-08-13 DIAGNOSIS — K29.70 GASTRITIS, UNSPECIFIED, WITHOUT BLEEDING: ICD-10-CM

## 2025-08-13 DIAGNOSIS — K74.60 UNSPECIFIED CIRRHOSIS OF LIVER: ICD-10-CM

## 2025-08-13 LAB
A1AT PHENOTYP SERPL IFE: NORMAL
A1AT SERPL-MCNC: 172 MG/DL (ref 101–187)

## 2025-08-13 PROCEDURE — 43239 EGD BIOPSY SINGLE/MULTIPLE: CPT | Performed by: INTERNAL MEDICINE

## 2025-08-13 PROCEDURE — 88305 TISSUE EXAM BY PATHOLOGIST: CPT | Performed by: INTERNAL MEDICINE

## 2025-08-15 LAB
CYTO UR: NORMAL
LAB AP CASE REPORT: NORMAL
LAB AP CLINICAL INFORMATION: NORMAL
PATH REPORT.FINAL DX SPEC: NORMAL
PATH REPORT.GROSS SPEC: NORMAL

## 2025-08-28 ENCOUNTER — OFFICE VISIT (OUTPATIENT)
Dept: INTERNAL MEDICINE | Age: 60
End: 2025-08-28
Payer: MEDICAID

## 2025-08-28 VITALS
BODY MASS INDEX: 24.19 KG/M2 | TEMPERATURE: 96.9 F | HEART RATE: 64 BPM | SYSTOLIC BLOOD PRESSURE: 136 MMHG | OXYGEN SATURATION: 99 % | WEIGHT: 145.2 LBS | HEIGHT: 65 IN | DIASTOLIC BLOOD PRESSURE: 66 MMHG

## 2025-08-28 DIAGNOSIS — R01.1 HEART MURMUR: ICD-10-CM

## 2025-08-28 DIAGNOSIS — K52.9 COLITIS: Primary | ICD-10-CM

## 2025-08-28 DIAGNOSIS — F41.8 MIXED ANXIETY AND DEPRESSIVE DISORDER: ICD-10-CM

## 2025-08-28 DIAGNOSIS — R11.0 CHRONIC NAUSEA: ICD-10-CM

## 2025-08-28 DIAGNOSIS — E78.5 HYPERLIPIDEMIA, UNSPECIFIED HYPERLIPIDEMIA TYPE: ICD-10-CM

## 2025-08-28 DIAGNOSIS — K74.60 HEPATIC CIRRHOSIS, UNSPECIFIED HEPATIC CIRRHOSIS TYPE, UNSPECIFIED WHETHER ASCITES PRESENT: ICD-10-CM

## 2025-08-28 PROCEDURE — 3075F SYST BP GE 130 - 139MM HG: CPT | Performed by: PHYSICIAN ASSISTANT

## 2025-08-28 PROCEDURE — 1160F RVW MEDS BY RX/DR IN RCRD: CPT | Performed by: PHYSICIAN ASSISTANT

## 2025-08-28 PROCEDURE — 99214 OFFICE O/P EST MOD 30 MIN: CPT | Performed by: PHYSICIAN ASSISTANT

## 2025-08-28 PROCEDURE — 1159F MED LIST DOCD IN RCRD: CPT | Performed by: PHYSICIAN ASSISTANT

## 2025-08-28 PROCEDURE — 3078F DIAST BP <80 MM HG: CPT | Performed by: PHYSICIAN ASSISTANT

## 2025-08-28 PROCEDURE — 1125F AMNT PAIN NOTED PAIN PRSNT: CPT | Performed by: PHYSICIAN ASSISTANT

## 2025-08-28 RX ORDER — ATORVASTATIN CALCIUM 10 MG/1
10 TABLET, FILM COATED ORAL NIGHTLY
Qty: 90 TABLET | Refills: 1 | Status: SHIPPED | OUTPATIENT
Start: 2025-08-28

## 2025-08-28 RX ORDER — PROMETHAZINE HYDROCHLORIDE 25 MG/1
25 TABLET ORAL EVERY 6 HOURS PRN
Qty: 30 TABLET | Refills: 0 | Status: SHIPPED | OUTPATIENT
Start: 2025-08-28

## 2025-08-28 RX ORDER — HYDROXYZINE HYDROCHLORIDE 50 MG/1
50 TABLET, FILM COATED ORAL EVERY 6 HOURS PRN
Qty: 360 TABLET | Refills: 1 | Status: SHIPPED | OUTPATIENT
Start: 2025-08-28